# Patient Record
Sex: FEMALE | Race: WHITE | NOT HISPANIC OR LATINO | Employment: FULL TIME | URBAN - METROPOLITAN AREA
[De-identification: names, ages, dates, MRNs, and addresses within clinical notes are randomized per-mention and may not be internally consistent; named-entity substitution may affect disease eponyms.]

---

## 2021-01-29 ENCOUNTER — OFFICE VISIT - HEALTHEAST (OUTPATIENT)
Dept: FAMILY MEDICINE | Facility: CLINIC | Age: 21
End: 2021-01-29

## 2021-01-29 DIAGNOSIS — Z13.228 SCREENING FOR METABOLIC DISORDER: ICD-10-CM

## 2021-01-29 DIAGNOSIS — Z00.00 ROUTINE GENERAL MEDICAL EXAMINATION AT A HEALTH CARE FACILITY: ICD-10-CM

## 2021-01-29 DIAGNOSIS — R35.0 URINE FREQUENCY: ICD-10-CM

## 2021-01-29 DIAGNOSIS — Z11.3 SCREEN FOR STD (SEXUALLY TRANSMITTED DISEASE): ICD-10-CM

## 2021-01-29 DIAGNOSIS — N39.0 RECURRENT UTI: ICD-10-CM

## 2021-01-29 LAB
ALBUMIN UR-MCNC: NEGATIVE MG/DL
APPEARANCE UR: CLEAR
BACTERIA #/AREA URNS HPF: ABNORMAL HPF
BILIRUB UR QL STRIP: NEGATIVE
COLOR UR AUTO: YELLOW
GLUCOSE UR STRIP-MCNC: NEGATIVE MG/DL
HGB UR QL STRIP: ABNORMAL
KETONES UR STRIP-MCNC: NEGATIVE MG/DL
LEUKOCYTE ESTERASE UR QL STRIP: ABNORMAL
NITRATE UR QL: NEGATIVE
PH UR STRIP: 7 [PH] (ref 5–8)
RBC #/AREA URNS AUTO: ABNORMAL HPF
SP GR UR STRIP: 1.02 (ref 1–1.03)
SQUAMOUS #/AREA URNS AUTO: ABNORMAL LPF
UROBILINOGEN UR STRIP-ACNC: ABNORMAL
WBC #/AREA URNS AUTO: ABNORMAL HPF

## 2021-01-29 ASSESSMENT — PATIENT HEALTH QUESTIONNAIRE - PHQ9: SUM OF ALL RESPONSES TO PHQ QUESTIONS 1-9: 0

## 2021-01-29 ASSESSMENT — ANXIETY QUESTIONNAIRES
GAD7 TOTAL SCORE: 0
3. WORRYING TOO MUCH ABOUT DIFFERENT THINGS: NOT AT ALL
4. TROUBLE RELAXING: NOT AT ALL
1. FEELING NERVOUS, ANXIOUS, OR ON EDGE: NOT AT ALL
2. NOT BEING ABLE TO STOP OR CONTROL WORRYING: NOT AT ALL
5. BEING SO RESTLESS THAT IT IS HARD TO SIT STILL: NOT AT ALL
IF YOU CHECKED OFF ANY PROBLEMS ON THIS QUESTIONNAIRE, HOW DIFFICULT HAVE THESE PROBLEMS MADE IT FOR YOU TO DO YOUR WORK, TAKE CARE OF THINGS AT HOME, OR GET ALONG WITH OTHER PEOPLE: NOT DIFFICULT AT ALL
7. FEELING AFRAID AS IF SOMETHING AWFUL MIGHT HAPPEN: NOT AT ALL
6. BECOMING EASILY ANNOYED OR IRRITABLE: NOT AT ALL

## 2021-01-29 ASSESSMENT — MIFFLIN-ST. JEOR: SCORE: 1384.13

## 2021-01-30 LAB — BACTERIA SPEC CULT: NO GROWTH

## 2021-01-31 ENCOUNTER — COMMUNICATION - HEALTHEAST (OUTPATIENT)
Dept: FAMILY MEDICINE | Facility: CLINIC | Age: 21
End: 2021-01-31

## 2021-02-02 LAB
C TRACH DNA SPEC QL PROBE+SIG AMP: NEGATIVE
N GONORRHOEA DNA SPEC QL NAA+PROBE: NEGATIVE

## 2021-02-03 ENCOUNTER — COMMUNICATION - HEALTHEAST (OUTPATIENT)
Dept: PEDIATRICS | Facility: CLINIC | Age: 21
End: 2021-02-03

## 2021-02-03 DIAGNOSIS — Z30.09 ENCOUNTER FOR COUNSELING REGARDING CONTRACEPTION: ICD-10-CM

## 2021-02-03 RX ORDER — LEVONORGESTREL/ETHIN.ESTRADIOL 0.1-0.02MG
1 TABLET ORAL DAILY
Qty: 3 PACKAGE | Refills: 4 | Status: SHIPPED | OUTPATIENT
Start: 2021-02-03 | End: 2022-08-04

## 2021-02-05 ENCOUNTER — COMMUNICATION - HEALTHEAST (OUTPATIENT)
Dept: FAMILY MEDICINE | Facility: CLINIC | Age: 21
End: 2021-02-05

## 2021-03-12 ENCOUNTER — COMMUNICATION - HEALTHEAST (OUTPATIENT)
Dept: FAMILY MEDICINE | Facility: CLINIC | Age: 21
End: 2021-03-12

## 2021-03-15 ENCOUNTER — COMMUNICATION - HEALTHEAST (OUTPATIENT)
Dept: FAMILY MEDICINE | Facility: CLINIC | Age: 21
End: 2021-03-15

## 2021-03-19 ENCOUNTER — COMMUNICATION - HEALTHEAST (OUTPATIENT)
Dept: FAMILY MEDICINE | Facility: CLINIC | Age: 21
End: 2021-03-19

## 2021-04-06 ENCOUNTER — COMMUNICATION - HEALTHEAST (OUTPATIENT)
Dept: FAMILY MEDICINE | Facility: CLINIC | Age: 21
End: 2021-04-06

## 2021-04-07 ENCOUNTER — COMMUNICATION - HEALTHEAST (OUTPATIENT)
Dept: SCHEDULING | Facility: CLINIC | Age: 21
End: 2021-04-07

## 2021-05-27 ASSESSMENT — PATIENT HEALTH QUESTIONNAIRE - PHQ9: SUM OF ALL RESPONSES TO PHQ QUESTIONS 1-9: 0

## 2021-05-28 ASSESSMENT — ANXIETY QUESTIONNAIRES: GAD7 TOTAL SCORE: 0

## 2021-06-05 VITALS
SYSTOLIC BLOOD PRESSURE: 110 MMHG | HEIGHT: 66 IN | HEART RATE: 72 BPM | DIASTOLIC BLOOD PRESSURE: 62 MMHG | BODY MASS INDEX: 21.34 KG/M2 | WEIGHT: 132.8 LBS

## 2021-06-07 ENCOUNTER — COMMUNICATION - HEALTHEAST (OUTPATIENT)
Dept: FAMILY MEDICINE | Facility: CLINIC | Age: 21
End: 2021-06-07

## 2021-06-07 DIAGNOSIS — N39.0 RECURRENT UTI: ICD-10-CM

## 2021-06-14 NOTE — TELEPHONE ENCOUNTER
You saw patient on 1/29/21 she would like you to prescribe her contraception. It was prescribed from virtual. Patient wants to transfer care to Dr. Byrne.

## 2021-06-16 PROBLEM — N39.0 RECURRENT UTI: Status: ACTIVE | Noted: 2021-01-29

## 2021-06-16 NOTE — TELEPHONE ENCOUNTER
Having urgency that started yesterday    No burning with urination    No blood in the urine    Has to go to the bathroom every hour    No changes in vaginal discharge    No fever that she is aware    Is hydrating well    No abdominal pain, flank pain,     No sores or rashes    No problems with urination    No nausea, vomiting    Took her bactrim after intercourse on monday    Last UTI was either January or Feb    No STD risk    Denies pregnancy    Patient is currently in [place] and will monitor symptoms for now.    Advised that if symptoms to be seen.    COVID 19 Nurse Triage Plan/Patient Instructions    Please be aware that novel coronavirus (COVID-19) may be circulating in the community. If you develop symptoms such as fever, cough, or SOB or if you have concerns about the presence of another infection including coronavirus (COVID-19), please contact your health care provider or visit  [email or web address].    Disposition/Instructions    Home care recommended. Follow home care protocol based instructions.    Thank you for taking steps to prevent the spread of this virus.  o Limit your contact with others.  o Wear a simple mask to cover your cough.  o Wash your hands well and often.    Resources    [facility]: About COVID-19: [email or web address]    CDC: What to Do If You're Sick: www.cdc.gov/coronavirus/2019-ncov/about/steps-when-sick.html    CDC: Ending Home Isolation: www.cdc.gov/coronavirus/2019-ncov/hcp/disposition-in-home-patients.html     CDC: Caring for Someone: www.cdc.gov/coronavirus/2019-ncov/if-you-are-sick/care-for-someone.html     [facility]: Interim Guidance for Hospital Discharge to Home: www.health.[facility].[place]/diseases/coronavirus/hcp/hospdischarge.pdf    [facility] clinical trials (COVID-19 research studies): clinicalaffairs.[facility]/umn-clinical-trials     Below are the COVID-19 hotlines at the [place] Department of Health ([facility]). Interpreters are available.    o For health questions: Call 128-742-9776 or 1-445.720.6498 (7 a.m. to 7 p.m.)  o For questions about schools and childcare: Call 068-291-4098 or 1-530.507.5070 (7 a.m. to 7 p.m.)       Rosa Isela King, RN   [facility] Medication Refill and Triage Nurse  12:01 PM  4/7/2021    Reason for Disposition    [1] Painful urination AND [2] EITHER frequency or urgency AND [3] has on-call doctor    Additional Information    Negative: Shock suspected (e.g., cold/pale/clammy skin, too weak to stand, low BP, rapid pulse)    Negative: Sounds like a life-threatening emergency to the triager    Negative: Unable to urinate (or only a few drops) and bladder feels very full    Negative: Vomiting    Negative: Patient sounds very sick or weak to the triager    Negative: Severe pain with urination    Negative: Fever > 100.5 F (38.1 C)    Negative: Side (flank) or lower back pain present    Negative: Taking antibiotic > 24 hours for UTI and fever persists    Negative: Taking antibiotic > 3 days for UTI and painful urination not improved    Negative: Unusual vaginal discharge    Negative: > 2 UTIs in last year    Negative: Patient is worried about sexually transmitted disease (STD)    Negative: Age > 50 years    Negative: Possibility of pregnancy    Negative: Painful urination AND EITHER frequency or urgency    Negative: Shock suspected (e.g., cold/pale/clammy skin, too weak to stand, low BP, rapid pulse)    Negative: Sounds like a life-threatening emergency to the triager    Negative: [1] Unable to urinate (or only a few drops) > 4 hours AND [2] bladder feels very full (e.g., palpable bladder or strong urge to urinate)    Negative: Vomiting    Negative: Patient sounds very sick or weak to the triager    Negative: [1] SEVERE pain with urination  (e.g., excruciating) AND [2] not improved after 2 hours of pain medicine and Sitz bath    Negative: Fever > 100.5 F (38.1 C)    Negative: Side (flank) or lower back pain present     Negative: Diabetes mellitus or weak immune system (e.g., HIV positive, cancer chemo, splenectomy, organ transplant, chronic steroids)    Negative: Bedridden (e.g., nursing home patient, CVA, chronic illness, recovering from surgery)    Negative: Artificial heart valve or artificial joint    Negative: Unusual vaginal discharge (e.g., bad smelling, yellow, green, or foamy-white)    Negative: Patient is worried about sexually transmitted disease (STD)    Negative: Possibility of pregnancy    Negative: Blood in urine (red, pink, or tea-colored)    Negative: Age > 50 years    Negative: > 2 UTI's in last year    Negative: All other patients with painful urination(Exception: [1] EITHER frequency or urgency AND [2] has on-call doctor)    Protocols used: URINATION PAIN - FEMALE-A-AH, URINATION PAIN - FEMALE-A-OH

## 2021-06-16 NOTE — TELEPHONE ENCOUNTER
Please advise if patient needs to come in for pap only office visit or ok to wait until next physical. She was just seen on 1/29/21 for physical but was not 21 yet and pap was not preformed.    Janet HERNANDEZ CMA ([facility])

## 2021-06-18 NOTE — PATIENT INSTRUCTIONS - HE
"Patient Instructions by Aditi Byrne MD at 1/29/2021  9:40 AM     Author: Aditi Byrne MD Service: -- Author Type: Physician    Filed: 1/29/2021 10:07 AM Encounter Date: 1/29/2021 Status: Signed    : Aditi Byrne MD (Physician)         Patient Education     Bladder Infection, Female (Adult)    Urine is normally doesn't have any bacteria in it. But bacteria can get into the urinary tract from the skin around the rectum. Or they can travel in the blood from elsewhere in the body. Once they are in your urinary tract, they can cause infection in the urethra (urethritis), the bladder (cystitis), or the kidneys (pyelonephritis).  The most common place for an infection is in the bladder. This is called a bladder infection. This is one of the most common infections in women. Most bladder infections are easily treated. They are not serious unless the infection spreads to the kidney.  The phrases \"bladder infection,\" \"UTI,\" and \"cystitis\" are often used to describe the same thing. But they are not always the same. Cystitis is an inflammation of the bladder. The most common cause of cystitis is an infection.  Symptoms  The infection causes inflammation in the urethra and bladder. This causes many of the symptoms. The most common symptoms of a bladder infection are:    Pain or burning when urinating    Having to urinate more often than usual    Urgent need to urinate    Only a small amount of urine comes out    Blood in urine    Abdominal discomfort. This is usually in the lower abdomen above the pubic bone.    Cloudy urine    Strong- or bad-smelling urine    Unable to urinate (urinary retention)    Unable to hold urine in (urinary incontinence)    Fever    Loss of appetite    Confusion (in older adults)  Causes  Bladder infections are not contagious. You can't get one from someone else, from a toilet seat, or from sharing a bath.  The most common cause of bladder infections is bacteria from the bowels. The bacteria " get onto the skin around the opening of the urethra. From there, they can get into the urine and travel up to the bladder, causing inflammation and infection. This usually happens because of:    Wiping improperly after urinating. Always wipe from front to back.    Bowel incontinence    Pregnancy    Procedures such as having a catheter inserted    Older age    Not emptying your bladder. This can allow bacteria a chance to grow in your urine.    Dehydration    Constipation    Sex    Use of a diaphragm for birth control   Treatment  Bladder infections are diagnosed by a urine test. They are treated with antibiotics and usually clear up quickly without complications. Treatment helps prevent a more serious kidney infection.  Medicines  Medicines can help in the treatment of a bladder infection:    Take antibiotics until they are used up, even if you feel better. It is important to finish them to make sure the infection has cleared.    You can use acetaminophen or ibuprofen for pain, fever, or discomfort, unless another medicine was prescribed. If you have chronic liver or kidney disease, talk with your healthcare provider before using these medicines. Also talk with your provider if you've ever had a stomach ulcer or gastrointestinal bleeding, or are taking blood-thinner medicines.    If you are given phenazopydridine to reduce burning with urination, it will cause your urine to become a bright orange color. This can stain clothing.  Care and prevention  These self-care steps can help prevent future infections:    Drink plenty of fluids to prevent dehydration and flush out your bladder. Do this unless you must restrict fluids for other health reasons, or your doctor told you not to.    Proper cleaning after going to the bathroom is important. Wipe from front to back after using the toilet to prevent the spread of bacteria.    Urinate more often. Don't try to hold urine in for a long time.    Wear loose-fitting clothes  and cotton underwear. Avoid tight-fitting pants.    Improve your diet and prevent constipation. Eat more fresh fruit and vegetables, and fiber, and less junk and fatty foods.    Avoid sex until your symptoms are gone.    Avoid caffeine, alcohol, and spicy foods. These can irritate your bladder.    Urinate right after intercourse to flush out your bladder.    If you use birth control pills and have frequent bladder infections, discuss it with your doctor.  Follow-up care  Call your healthcare provider if all symptoms are not gone after 3 days of treatment. This is especially important if you have repeat infections.  If a culture was done, you will be told if your treatment needs to be changed. If directed, you can call to find out the results.  If X-rays were done, you will be told if the results will affect your treatment.  Call 911  Call 911 if any of the following occur:    Trouble breathing    Hard to wake up or confusion    Fainting or loss of consciousness    Rapid heart rate  When to seek medical advice  Call your healthcare provider right away if any of these occur:    Fever of 100.4 F (38.0 C) or higher, or as directed by your healthcare provider    Symptoms are not better by the third day of treatment    Back or belly (abdominal) pain that gets worse    Repeated vomiting, or unable to keep medicine down    Weakness or dizziness    Vaginal discharge    Pain, redness, or swelling in the outer vaginal area (labia)  Date Last Reviewed: 10/1/2016    5250-2919 [employer]. [address] [place] [address]. All rights reserved. This information is not intended as a substitute for professional medical care. Always follow your healthcare professional's instructions.

## 2021-06-25 NOTE — TELEPHONE ENCOUNTER
RN cannot approve Refill Request    RN can NOT refill this medication med is not covered by policy/route to provider. Last office visit: 2021 Aditi Byrne MD Last Physical: Visit date not found Last MTM visit: Visit date not found Last visit same specialty: 2021 Aditi Byrne MD.  Next visit within 3 mo: Visit date not found  Next physical within 3 mo: Visit date not found      Camryn Guzmán, [facility] Triage/Med Refill 2021    Requested Prescriptions   Pending Prescriptions Disp Refills     sulfamethoxazole-trimethoprim (BACTRIM DS) 800-160 mg per tablet 30 tablet 0     Si tab after sex to prevent recurrent UTI       There is no refill protocol information for this order

## 2021-06-30 NOTE — PROGRESS NOTES
Progress Notes by Aditi Byrne MD at 1/29/2021  9:40 AM     Author: Aditi Byrne MD Service: -- Author Type: Physician    Filed: 1/29/2021 10:13 AM Encounter Date: 1/29/2021 Status: Signed    : Aditi Byrne MD (Physician)       FEMALE PREVENTATIVE EXAM    Assessment and Plan:     Patient has been advised of split billing requirements and indicates understanding: Yes    Olivia was seen today for urine problem.    Routine general medical examination at a health care facility  I discussed the following with the patient:   Adult Healthy Living: Importance of regular exercise  Healthy nutrition  Getting adequate sleep  Stress management  Supplement use  Herbal medications/alternative medical therapies      Screening for metabolic disorder  -     Glycosylated Hemoglobin A1c  -     Lipid Cascade    Urine frequency  -Trace leukocyte esterase will wait for urine culture before doing any treatment      Urinalysis-UC if Indicated    Screen for STD (sexually transmitted disease)  Last STD check was 2018 never positive  -     Chlamydia trachomatis & Neisseria gonorrhoeae, Amplified Detection    Recurrent UTI  Comments:  3 bladder infections in the last several months. Last 1 was  at  urgent care with justin.prior Tx with  Macrobid. Symptoms seem to occur after intercourse.   Preventive antibiotic started 1 tablet after each sexual activity  Orders:  -     sulfamethoxazole-trimethoprim (BACTRIM DS) 800-160 mg per tablet; 1 tab after sex to prevent recurrent UTI        Next follow up:  1 yr for annual physical    Immunization Reviewed and if needed ordered please see A/P    There are no preventive care reminders to display for this patient.    Immunization History   Administered Date(s) Administered   ? Dtap 2000, 2000, 2000, 06/11/2001, 05/12/2005   ? HPV 9 Valent 06/17/2016   ? HPV Quadrivalent 03/06/2014, 09/09/2014   ? Hep B / HiB 2000, 2000, 03/21/2001   ? Hepatitis A, Ped/Adol 2 Dose  IM (18yr & under) 03/06/2014, 06/17/2016   ? INFLUENZA,SEASONAL QUAD, PF, =/> 6months 02/19/2018, 11/06/2019   ? IPV 2000, 2000, 2000, 05/12/2005   ? Influenza W9g0-05, 11/21/2009   ? Influenza, Live, Nasal LAIV3 11/26/2010   ? Influenza,live, Nasal Laiv4 11/26/2010   ? Influenza,seasonal, Inj IIV3 12/29/2006   ? MMR 06/11/2001, 05/12/2005   ? Meningococcal MCV4O 06/17/2016   ? Meningococcal MCV4P 06/20/2012, 06/17/2016   ? POLIO, Unspecified 2000, 2000, 2000, 05/12/2005   ? Pneumo Conj 7-V(before 2010) 2000, 2000, 03/21/2001, 06/11/2001   ? Tdap 06/20/2012   ? Varicella 03/21/2001, 02/02/2011         The following high BMI interventions were performed this visit: dietary management education, guidance, and counseling    I have had an Advance Directives discussion with the patient.    Subjective:   Chief Complaint: Olivia Stanley is an 20 y.o. female here for a preventative health visit.     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  Couple virtual visit in 1 urgent care visit which was the last 1 treated with different antibiotics during that time  Patient trying her best to prevent by urinating and cleaning herself after sex  No culture report I can see from previous labs  Patient's last menstrual period was 01/07/2021 (exact date).     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 No  Do you typically exercising at least 40 min, 3-4 times per week?  Yes  Do you usually eat at least 4 servings of fruit and vegetables a day, include whole grains and fiber and avoid regularly eating high fat foods? Yes  Have you had an eye exam in the past two years? Yes  Do you see a dentist twice per year? NO  Do you have any concerns regarding sleep? No    Safety Screen  If you own firearms, are they secured in a locked gun cabinet or with trigger locks? The patient does not own any firearms    Do you feel you are safe where you are living?: Yes (1/29/2021  9:52 AM)  Do you feel you are safe in your relationship(s)?: Yes (1/29/2021  9:52 AM)      Review of Systems:  Please see above.  The rest of the review of systems are negative for all systems.     Pap History:   Yes - updated in Problem List and Health Maintenance accordingly    Cancer Screening     Patient has no health maintenance due at this time          Patient Care Team:  Provider, No Primary Care as PCP - General      History     Reviewed By Date/Time Sections Reviewed    Dennise Hull CMA 1/29/2021  9:52 AM Tobacco, Alcohol, Drug Use             Objective:   Vital Signs:   Visit Vitals  /62 (Patient Site: Left Arm, Patient Position: Sitting, Cuff Size: Adult Regular)   Pulse 72   Ht 5' 6\" (1.676 m)   Wt 132 lb 12.8 oz (60.2 kg)   LMP 01/07/2021 (Exact Date)   Breastfeeding No   BMI 21.43 kg/m         PHYSICAL EXAM  Physical Exam:  General Appearance:  Appears comfortable, Alert, cooperative, no distress,   Head: Normocephalic, without obvious abnormality, atraumatic  Eyes: PERRL, conjunctiva/corneas clear, EOM's intact, both eyes             Nose: Nares normal, no drainage   Throat: Lips, mucosa, and tongue normal; teeth and gums normal  Neck: Supple, symmetrical, trachea midline, no adenopathy;                      Lungs: Clear to auscultation bilaterally, respirations unlabored  Heart: Regular rate and rhythm, " S1 and S2 normal, no murmur, rubs or gallop  Abdomen: Soft, non-tender, bowel sounds active all four quadrants,   no masses, no organomegaly  Extremities: Extremities normal, atraumatic, no cyanosis or edema  Pulses: DP pulses are 1-2+ bilat.    Skin: no rashes or lesions  Neurologic: normal and equal strength bilat in upper and lower extremities                 Medication List          Accurate as of January 29, 2021 10:11 AM. If you have any questions, ask your nurse or doctor.            START taking these medications    sulfamethoxazole-trimethoprim 800-160 mg per tablet  Also known as: Bactrim DS  INSTRUCTIONS: 1 tab after sex to prevent recurrent UTI  Started by: Aditi Byrne MD           CONTINUE taking these medications    levonorgestrel-ethinyl estradiol 0.1-20 mg-mcg per tablet  Also known as: RAMY YEH LESSINA  INSTRUCTIONS: Take 1 tablet by mouth daily.              Where to Get Your Medications      These medications were sent to [facility] [id number] [facility] - [place], [place] - [address], [place] [address]    Phone: [phone]     sulfamethoxazole-trimethoprim 800-160 mg per tablet         Additional Screenings Completed Today:

## 2021-07-03 NOTE — ADDENDUM NOTE
Addendum Note by Benjamin Wilcox CMA at 1/29/2021  9:40 AM     Author: Benjamin Wilcox CMA Service: -- Author Type: Certified Medical Assistant    Filed: 1/29/2021 11:34 AM Encounter Date: 1/29/2021 Status: Signed    : Benjamin Wilcox CMA (Certified Medical Assistant)    Addended by: BENJAMIN WILCOX on: 1/29/2021 11:34 AM        Modules accepted: Orders

## 2021-10-21 ENCOUNTER — NURSE TRIAGE (OUTPATIENT)
Dept: NURSING | Facility: CLINIC | Age: 21
End: 2021-10-21

## 2021-10-21 DIAGNOSIS — Z30.09 COUNSELING FOR BIRTH CONTROL REGARDING INTRAUTERINE DEVICE (IUD): Primary | ICD-10-CM

## 2021-10-21 RX ORDER — MISOPROSTOL 200 UG/1
TABLET ORAL
Qty: 1 TABLET | Refills: 0 | Status: SHIPPED | OUTPATIENT
Start: 2021-10-21 | End: 2022-08-04

## 2021-10-21 NOTE — TELEPHONE ENCOUNTER
Clinic Action Needed: Yes, pt has question re: procedure. Please call her at [phone] or may respond via [employer].    [facility] Triage Call  Presenting Problem:    Allison calls if clinics can have her sedated for an IUD insertion, reason: would not like to experience the pain. No anxiety concerns.    Pt has an appointment with PCP on 11/4.    Sharda Cid RN/[place] Nurse Advisor      Additional Information    Nursing judgment    Protocols used: NO PROTOCOL AVAILABLE - INFORMATION ONLY-A-OH

## 2021-10-21 NOTE — TELEPHONE ENCOUNTER
Unfortunately we do not do any conscious sedation prior to the procedure in clinic setting    We can refer her to OB if she like    For now :  I did send the prescription for Cytotec which she can insert inside the vagina night before the procedure which keep the cervix open and easy to put him    Also to recommend taking 800 mg of ibuprofen 1 to 2-hour before the procedure procedure    I can also send the prescription for lorazepam which will help with anxiety and may be some pain.  Which she can take on the day of the procedure at the clinic  I will not recommend driving while taking this medication      Aditi Byrne MD 10/21/2021 2:44 PM   [facility].  [phone]

## 2021-11-02 NOTE — TELEPHONE ENCOUNTER
Patient never received Dr. Byrne's message from 10/21 so she was calling back to see if this was addressed. Relayed Dr. Byrne's message and she would like a referral placed for OB/GYN for IUD insertion.  Please place referral for patient.  Shelbie Dhillon LPN

## 2021-11-04 ENCOUNTER — OFFICE VISIT (OUTPATIENT)
Dept: FAMILY MEDICINE | Facility: CLINIC | Age: 21
End: 2021-11-04
Payer: COMMERCIAL

## 2021-11-04 ENCOUNTER — TELEPHONE (OUTPATIENT)
Dept: OBGYN | Facility: CLINIC | Age: 21
End: 2021-11-04

## 2021-11-04 VITALS
WEIGHT: 135.7 LBS | DIASTOLIC BLOOD PRESSURE: 66 MMHG | SYSTOLIC BLOOD PRESSURE: 104 MMHG | BODY MASS INDEX: 21.9 KG/M2 | HEART RATE: 68 BPM

## 2021-11-04 DIAGNOSIS — N39.0 RECURRENT UTI: ICD-10-CM

## 2021-11-04 DIAGNOSIS — Z30.09 COUNSELING FOR BIRTH CONTROL, INTRAUTERINE DEVICE: Primary | ICD-10-CM

## 2021-11-04 DIAGNOSIS — L72.3 SEBACEOUS CYST: ICD-10-CM

## 2021-11-04 DIAGNOSIS — Z12.4 SCREENING FOR CERVICAL CANCER: ICD-10-CM

## 2021-11-04 PROCEDURE — 99214 OFFICE O/P EST MOD 30 MIN: CPT | Performed by: FAMILY MEDICINE

## 2021-11-04 RX ORDER — NITROFURANTOIN 25; 75 MG/1; MG/1
CAPSULE ORAL
Qty: 30 CAPSULE | Refills: 1 | Status: SHIPPED | OUTPATIENT
Start: 2021-11-04 | End: 2023-01-05

## 2021-11-04 NOTE — TELEPHONE ENCOUNTER
Referral for iud insertion states pt wants sedation for this. Pls call pt to discuss what if any sedation can be used,  needed? Etc. Unsure if pt had iud consult yet?

## 2021-11-04 NOTE — PROGRESS NOTES
Assessment/plan   Olivia Stanley is a 21 year old female who is  establish patient to my practice here with chief complaint     Patient presents with:  Discuss Pap and IUD  Reaction: Drug reaction to Bactrim  Tetanus Vaccine: Update  Bumps: Bumps on scalp        Olivia was seen today for discuss pap and iud, reaction, tetanus vaccine and bumps.    Diagnoses and all orders for this visit:    Counseling for birth control, intrauterine device  Patient will reach out to gynecologist for further management of this request of IUD placement  Screening for cervical cancer  -     Pap Screen only - recommended age 21 - 24 years; Future    Recurrent UTI  -Patient feel Bactrim causing vaginal itching as soon as she use it postcoital.  Changed to Macrobid for now and see if it similar side effect most likely is either dryness or yeast infection causing the symptoms of itching       nitroFURantoin macrocrystal-monohydrate (MACROBID) 100 MG capsule; One tab before sex for preventing recurrent UT    Sebaceous cyst  Comments:  scalp very just small to sebaceous cyst at the crown.  Noninfected nontender reassured the patient that usually they do not cause much problem but if they get bigger and painful she can reach out to our clinic and we be able to manage to remove them    Other orders  -     REVIEW OF HEALTH MAINTENANCE PROTOCOL ORDERS        Subjective:      HPI: Olivia Stanley is a 21 year old female is here for.    Patient's last menstrual period was 07/24/2021 (exact date).  Taking pills every day but no break through bleeding for last 3 month. Most the time cycle come with 2-3 days of sugar pills   Currently active with condoms   Recurrent UTI's currently on preventive bactrim, but feel she might be allergic, stats whenever take the bactrim have immediate itching in the vagina and feel sore for approx one hr?? Not sure if we should call it a true allergic reaction.  She might be allergic to material of the condom per  patient she using latex free.    Patient also looking to get the IUD insertion done but quite scared about the pain she want, conscious sedation which our clinic do not have capacity to do it.  But if willing we can do Cytotec overnight with some Xanax during the visit , we already placed a referral to our gynecologist colleagues to see if able to handle this request and at the same time Pap smear can be done          I have personally reviewed the patient's allergies, medications, past medical history, family history, social history, rooming notes and problem list in detail and updated the patient record as necessary.      No past medical history on file.  No past surgical history on file.  Patient has no active allergies.  Current Outpatient Medications   Medication Sig Dispense Refill     levonorgestrel-ethinyl estradiol (AVIANE,ALESSE,LESSINA) 0.1-20 mg-mcg per tablet [LEVONORGESTREL-ETHINYL ESTRADIOL (AVIANE,ALESSE,LESSINA) 0.1-20 MG-MCG PER TABLET] Take 1 tablet by mouth daily. 3 Package 4     nitroFURantoin macrocrystal-monohydrate (MACROBID) 100 MG capsule One tab before sex for preventing recurrent UT 30 capsule 1     misoprostol (CYTOTEC) 200 MCG tablet Insert close to vagina night before the procedure (Patient not taking: Reported on 11/4/2021) 1 tablet 0     No family history on file.    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  Sexually active new boyfriend and last for 5 months he works for [employer]  Grew up in [place]  Parents  when she was young mom work as a nurse for dialysis cl in  Dad and brother currently living in same household with grandfather    Aditi Byrne MD 1/29/2021 10:10 AM         Objective:     Vitals:    11/04/21 1243    BP: 104/66   Pulse: 68   Weight: 61.6 kg (135 lb 11.2 oz)       Physical Exam:   Physical Exam:  General Appearance:  Appears comfortable, Alert, cooperative, no distress,   Head: Normocephalic, without obvious abnormality, atraumatic  Eyes: PERRL, conjunctiva/corneas clear, EOM's intact, both eyes             Nose: Nares normal, no drainage   Throat: Lips, mucosa, and tongue normal; teeth and gums normal  Neck: Supple, symmetrical, trachea midline, no adenopathy;                      Lungs: Clear to auscultation bilaterally, respirations unlabored  Heart: Regular rate and rhythm, S1 and S2 normal, no murmur, rubs or gallop  Extremities: Extremities normal, atraumatic, no cyanosis or edema  Pulses: DP pulses are 1-2+ bilat.    Skin: Pea-sized sebaceous cyst midline on the scalp nontender noninfected  Neurologic: normal and equal strength bilat in upper and lower extremities     25minutes spent on the day of encounter doing chart review, history and exam, documentation, and further activities as noted.     This note has been dictated using voice recognition software. Any grammatical or context distortions are unintentional and inherent to the software    Aditi Byrne MD

## 2021-11-06 ENCOUNTER — MYC MEDICAL ADVICE (OUTPATIENT)
Dept: FAMILY MEDICINE | Facility: CLINIC | Age: 21
End: 2021-11-06
Payer: COMMERCIAL

## 2022-03-27 ENCOUNTER — HEALTH MAINTENANCE LETTER (OUTPATIENT)
Age: 22
End: 2022-03-27

## 2022-07-12 ASSESSMENT — ANXIETY QUESTIONNAIRES
8. IF YOU CHECKED OFF ANY PROBLEMS, HOW DIFFICULT HAVE THESE MADE IT FOR YOU TO DO YOUR WORK, TAKE CARE OF THINGS AT HOME, OR GET ALONG WITH OTHER PEOPLE?: SOMEWHAT DIFFICULT
GAD7 TOTAL SCORE: 8
5. BEING SO RESTLESS THAT IT IS HARD TO SIT STILL: NOT AT ALL
3. WORRYING TOO MUCH ABOUT DIFFERENT THINGS: MORE THAN HALF THE DAYS
6. BECOMING EASILY ANNOYED OR IRRITABLE: SEVERAL DAYS
7. FEELING AFRAID AS IF SOMETHING AWFUL MIGHT HAPPEN: NOT AT ALL
GAD7 TOTAL SCORE: 8
1. FEELING NERVOUS, ANXIOUS, OR ON EDGE: MORE THAN HALF THE DAYS
4. TROUBLE RELAXING: MORE THAN HALF THE DAYS
7. FEELING AFRAID AS IF SOMETHING AWFUL MIGHT HAPPEN: NOT AT ALL
2. NOT BEING ABLE TO STOP OR CONTROL WORRYING: SEVERAL DAYS
GAD7 TOTAL SCORE: 8

## 2022-07-13 ENCOUNTER — TELEPHONE (OUTPATIENT)
Dept: BEHAVIORAL HEALTH | Facility: CLINIC | Age: 22
End: 2022-07-13

## 2022-07-13 ENCOUNTER — HOSPITAL ENCOUNTER (OUTPATIENT)
Dept: BEHAVIORAL HEALTH | Facility: CLINIC | Age: 22
Discharge: HOME | End: 2022-07-13
Attending: FAMILY MEDICINE
Payer: COMMERCIAL

## 2022-07-13 PROCEDURE — 999N000216 HC STATISTIC ADULT CD FACE TO FACE-NO CHRG: Mod: GT,95 | Performed by: COUNSELOR

## 2022-07-13 ASSESSMENT — COLUMBIA-SUICIDE SEVERITY RATING SCALE - C-SSRS
TOTAL  NUMBER OF ABORTED OR SELF INTERRUPTED ATTEMPTS LIFETIME: NO
ATTEMPT LIFETIME: NO
1. IN THE PAST MONTH, HAVE YOU WISHED YOU WERE DEAD OR WISHED YOU COULD GO TO SLEEP AND NOT WAKE UP?: NO
1. HAVE YOU WISHED YOU WERE DEAD OR WISHED YOU COULD GO TO SLEEP AND NOT WAKE UP?: YES
TOTAL  NUMBER OF INTERRUPTED ATTEMPTS LIFETIME: NO
6. HAVE YOU EVER DONE ANYTHING, STARTED TO DO ANYTHING, OR PREPARED TO DO ANYTHING TO END YOUR LIFE?: NO
2. HAVE YOU ACTUALLY HAD ANY THOUGHTS OF KILLING YOURSELF?: NO

## 2022-07-13 ASSESSMENT — PATIENT HEALTH QUESTIONNAIRE - PHQ9
10. IF YOU CHECKED OFF ANY PROBLEMS, HOW DIFFICULT HAVE THESE PROBLEMS MADE IT FOR YOU TO DO YOUR WORK, TAKE CARE OF THINGS AT HOME, OR GET ALONG WITH OTHER PEOPLE: SOMEWHAT DIFFICULT
SUM OF ALL RESPONSES TO PHQ QUESTIONS 1-9: 5
SUM OF ALL RESPONSES TO PHQ QUESTIONS 1-9: 5

## 2022-07-13 NOTE — TELEPHONE ENCOUNTER
Writer has fwd medication management referral only to TC RN pool as next level of care set and replied to referral source. Will wait to hear if referral is appropriate or inappropriate    Layne Lujanra  07/13/22  239    ----- [facility] from [facility]que[facility] sent at 7/13/2022  2:33 PM CDT -----  Regarding: Referral  Transition Clinic Referral   [place] Only   Limited [place] Availability    Type of Referral:    ____Therapy Only    ___X_ Medication Only: Referral will automatically be declined if no next level of care scheduled. Suboxone and Opioid management referrals are automatically denied.  ____Therapy & Medication:  Referral will automatically be declined if no next level of care scheduled. Suboxone and Opioid management referrals are automatically denied.  ____Diagnostic Assessment     Suboxone and Opioid Management Referrals are Automatically Denied    TC Psychiatry cannot see patients who do not have active medical insurance    Referring Provider Name: BRIGIDA Aguilar, [facility]    Clinician completing the assessment. Same as above    Referring Provider: OTHER: Assessment Center    If known, referring provider contact name: same as above; Phone Number: [phone]  Service Line/Location: [facility] [place]    Reason for Transition Clinic Referral: bridging care until psychiatric appointment scheduled    Next Level of Care Patient Will Be Transitioned To:Date: Monday, 8/15/2022  Time: 1:30 pm - 2:30 pm  Provider: Katlin Ya MA, PA-C  Location: [facility], [address], [place] [address]  Phone: [phone]  Type: Telepsychiatry    What Would Be Helpful from the Transition Clinic: seeking medication for anxiety.     Needs: NO    Does Patient Have Access to Technology: yes    Patient E-mail Address: [email or web address]    Current Patient Phone Number: [phone]    Clinician Gender Preference (if applicable): YES:  female, if able.     Landy Mcqueen, [facility], [facility]    Thanks!

## 2022-07-13 NOTE — TELEPHONE ENCOUNTER
Patient have a video appointment today at 2pm with [facility]. Writer placed a call to check in patient. Unable to get a hold of patient. Writer left a voicemail with writer's call back number.

## 2022-07-13 NOTE — PROGRESS NOTES
"[facility] Mental Health and Addiction Assessment [facility]    ADULT Mental Health Assessment Appointment Note    PATIENT'S NAME:  Olivia Stanley    MRN: [id number]  YOB: 2000  Address:  [address] [place], [place] [id number]  PREFERRED PHONE: [phone]  May we leave a referral related message: Yes    DATE OF SERVICE: 22  START TIME: 2:00pm  END TIME: 2:20pm  SERVICE MODALITY:  Video Visit:      Provider verified identity through the following two step process.  Patient provided:  Patient     Telemedicine Visit: The patient's condition can be safely assessed and treated via synchronous audio and visual telemedicine encounter.      Reason for Telemedicine Visit: Services only offered telehealth    Originating Site (Patient Location): Patient's home    Distant Site (Provider Location): Provider Remote Setting- Home Office    Consent:  The patient/guardian has verbally consented to: the potential risks and benefits of telemedicine (video visit) versus in person care; bill my insurance or make self-payment for services provided; and responsibility for payment of non-covered services.     Patient would like the video invitation sent by:  My Chart    Mode of Communication:  Video Conference via [employer]    As the provider I attest to compliance with applicable laws and regulations related to telemedicine.    Identifying Information:  Patient is a 22 year old,  individual.  Patient was referred for an assessment by self.  Patient attended the session alone. Patient identified their preferred language to be English. Patient reported they does not need the assistance of an  or other support involved in therapy.     Services Requested:   The reason for seeking services at this time is: \"Anxiety\".  Patient reports she is interested in medications. Patient reports she met with a psychiatrist when she was younger but that was brief. The problem(s) began 2004.   Patient has " attempted to resolve these concerns in the past by therapy. Patient states, I have always been an anxious person since being a kid. I think my parents should have done something but what is done is done. It is more manageable then it was as a kid. Patient reports periods of feeling okay and periods that are worse. Patient states, feel like I am doing a bad job with my partner or at work, or that everyone hates me. Patient states, I just graduated recently and started my new job. Patient reports she does not want things to get worse.      Patient's highest education level was college graduate.  Patient's current relationship status is she has a partner or significant other. Patient identified their sexual orientation as bi-sexual.  Patient reported having  0 child(michael). Patient identified partner; mother; co-worker as part of their support system.  Patient identified the quality of these relationships as good.  Patient's current living/housing situation involves living with some family. Patient is currently employed fulltime. 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 Work with people my age with similar background. I am on call 2 weeks/month. Patient reports their finances are obtained through employment; parents. Patient does not identify finances as a current stressor. Patient does not have legal concerns.    Mental Health:  Review of Symptoms per patient report:  Depression: Change in appetite and Low self-worth  Liliya: No Symptoms  Psychosis: No Symptoms  Anxiety: Excessive worry, Nervousness, Physical complaints, such as headaches, stomachaches, muscle tension, Social anxiety, Ruminations and Irritability, patient states, always had GI issues, do not think my diet has changed too much. Clench my abs a lot and notice that after a while, I am doing it. Since I started the job, reduced appetite and drinking a little bit more. If having a really bad day get  "sinking feeling in stomach and shortness of breath and couple days where I had to leave because it got really bad. 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 Cannot have water or food and get \"hangry\". When having a really intense day it will be later in the day and it slips out.   Panic: Shortness of breath and Sense of impending doom  Post Traumatic Stress Disorder: No Symptoms  Eating Disorder: No Symptoms  ADD / ADHD: No symptoms  Conduct Disorder: No symptoms  Autism Spectrum Disorder: No symptoms  Obsessive Compulsive Disorder: No Symptoms     Substance Use:  Do you  have a history of alcohol or illicit drug use?           Substance History of use Age of first use Date of last use       Pattern and duration of use (include amounts and frequency)   Alcohol currently use    19 7/11/2022 Per patient: when I was in school earlier this year, one beer couple times per week when got home from school because I like it. Recently it has been more frequent and more quantity. More nights of the week and couple beers.    Cannabis    used in the past 19 7/12/2019 REPORTS SUBSTANCE USE: N/A      Amphetamines    never used   REPORTS SUBSTANCE USE: N/A   Cocaine/crack     never used       REPORTS SUBSTANCE USE: N/A   Hallucinogens never used         REPORTS SUBSTANCE USE: N/A   Inhalants never used         REPORTS SUBSTANCE USE: N/A   Heroin never used         REPORTS SUBSTANCE USE: N/A   Other Opiates never used   REPORTS SUBSTANCE USE: N/A   Benzodiazepine    never used   REPORTS SUBSTANCE USE: N/A   Barbiturates never used   REPORTS SUBSTANCE USE: N/A   Over the counter meds never used   REPORTS SUBSTANCE USE: N/A   Caffeine currently use 16  Per patient: call day for work or a lot happening, stop for a coffee, usually half caf latte, caffeine makes it worse, so try to be stringent when I let myself have it. Already anxious or if I am in a " bad mood avoid it all together.   Nicotine  never used   REPORTS SUBSTANCE USE: N/A   Other substances not listed above:  Identify:  never used   REPORTS SUBSTANCE USE: N/A     Patient reported the following problems as a result of their substance use: no problems, not applicable.     Significant Losses / Trauma / Abuse / Neglect Issues:   Patient did not serve in the .  There are indications or report of significant loss, trauma, abuse or neglect issues related to: are no indications and client denies any losses, trauma, abuse, or neglect concerns.  Concerns for possible neglect are not present.     Medical History:  Patient reports current meds as:        Current Outpatient Medications   Medication Sig     nitroFURantoin macrocrystal-monohydrate (MACROBID) 100 MG capsule One tab before sex for preventing recurrent UT      No current facility-administered medications for this encounter.      Medication Adherence:  Patient reports taking.     Patient Allergies:  No Known Allergies     Medical History:    Past Medical History   History reviewed. No pertinent past medical history.   Patient denies any issues with pain.   There are not significant appetite / nutritional concerns / weight changes.   Patient does not report a history of head injury / trauma / cognitive impairment.      Current Mental Status Exam:   Appearance:  Appropriate    Eye Contact:  Good   Psychomotor:  Normal   Attitude / Demeanor: Cooperative  Pleasant  Speech Rate:  Normal/ Responsive  Volume:  Normal  volume  Language:  intact  Mood:   Anxious   Affect:   Appropriate    Thought Content: Clear   Thought Process: Coherent     Associations:  No loosening of associations  Insight:   Good   Judgment:  Intact   Orientation:  All  Attention:  Good    Rating Scales:  PHQ9:    PHQ-9 SCORE 1/29/2021 7/13/2022   PHQ-9 Total Score MyChart - 5 (Mild depression)   PHQ-9 Total Score 0 5   ;    GAD7:    IRWIN-7 SCORE 1/29/2021 7/12/2022   Total Score -  8 (mild anxiety)   Total Score 0 8     Safety Assessment:   Current Safety Concerns:  [place] Suicide Severity Rating Scale (Lifetime/Recent)  [place] Suicide Severity Rating (Lifetime/Recent) 7/13/2022   1. Wish to be Dead (Lifetime) 1   Wish to be Dead Description (Lifetime) Patient states, infrequently in the past has SI, not within the last couple years or so. Between high school and college. Not very often.   1. Wish to be Dead (Past 1 Month) 0   2. Non-Specific Active Suicidal Thoughts (Lifetime) 0   Actual Attempt (Lifetime) 0   Has subject engaged in non-suicidal self-injurious behavior? (Lifetime) 0   Interrupted Attempts (Lifetime) 0   Aborted or Self-Interrupted Attempt (Lifetime) 0   Preparatory Acts or Behavior (Lifetime) 0   Calculated C-SSRS Risk Score (Lifetime/Recent) No Risk Indicated   Patient states, infrequently in the past has SI, not within the last couple years or so. Between high school and college. Not very often.  Patient denies current homicidal ideation and behaviors.  Patient denies current self-injurious ideation and behaviors.    Patient denied risk behaviors associated with substance use.  Patient denies any high risk behaviors associated with mental health symptoms.  Patient reports the following current concerns for their personal safety: None.  Patient reports there are not  firearms in the house.    Clinical Impressions:  A. Excessive anxiety and worry about a number of events or activities (such as work or school performance).   B. The person finds it difficult to control the worry.  C. Select 3 or more symptoms (required for diagnosis). Only one item is required in children.   - Restlessness or feeling keyed up or on edge.    - Irritability.    - Muscle tension.   Unspecified Anxiety Disorder , Symptoms characteristic of an anxiety disorder that caused clinically significant distress or impairment in social, occupational, or other important areas of functioning predominate  but do not meet the full criteria for any of the disorders of the anxiety disorders diagnostic class.    Therapeutic Interventions Provided: supportive active listening, provided Psychoeducational support and emotional validation      Recommendations/Plan: Outpatient psychiatry. Patient requested this and agreed with plan. The writer scheduled a psychiatric appointment with the patient. See the appointment details below. [facility] referral placed as well. The writer put appointment information, along with a resource, and her contact information in the patient's MyChart. Provided the patient the appointment information in video chat during the appointment as well.  The writer told the patient to reach out if she wanted any resources or had other questions.  Patient verbalized understanding.     Referrals: [facility] referral placed and   PSYCHIATRIC APPOINTMENT SCHEDULED  Date: Monday, 8/15/2022  Time: 1:30 pm - 2:30 pm  Provider: Katlin Ya MA, PA-C  Location: [facility], [address], [address], [place] [address]  Phone: [phone]  Type: Telepsychiatry  Patient Instructions: Please fill New Patient Form by using following link or call us to request link to be sent to you. [email or web address] (1) All forms need to be completed AT LEAST 96 HOURS prior to appointment. (2) Please call us at [phone] - 96 HOURS prior to your scheduled appointment to confirm that you are able to attend. We will provide you information about how to log into video call software when you call.    Landy Mcqueen, [facility], LADC July 13, 2022  Mental Health and Addiction Services Assessment Center

## 2022-07-13 NOTE — PATIENT INSTRUCTIONS
Olivia,  It was a pleasure meeting with you today. I put in a referral for [facility] to call you to set up a psychiatric appointment with them in the meantime until your scheduled appointment with [facility], Katlin Ya MA, PA-C, who will be your long term provider. They should call you today or tomorrow. Their telephone number is Transition Clinic [phone]. [facility] appointment details are below. I also put a resource below. If you have any questions or want more resources do not hesitate to reach out.     PSYCHIATRIC APPOINTMENT SCHEDULED  Date: Monday, 8/15/2022  Time: 1:30 pm - 2:30 pm  Provider: Katlin Ya MA, PA-C  Location: [facility], [address] [place] [address], [address], [place] [address]  Phone: [phone]  Type: Telepsychiatry  Patient Instructions: Please fill New Patient Form by using following link or call us to request link to be sent to you. [email or web address] (1) All forms need to be completed AT LEAST 96 HOURS prior to appointment. (2) Please call us at [phone] - 96 HOURS prior to your scheduled appointment to confirm that you are able to attend. We will provide you information about how to log into video call software when you call.    RESOURCE  [facility] (free short term therapy)  Website: [email or web address]    Landy Snowden, BRIGIDA, LADC  Licensed Psychotherapist  [facility]  Mental Health and Addiction Services Assessment [facility] [email or web address]  [email or web address]  Office: [phone]  Fax: [phone]  Gender pronouns: she/her/hers  Employed by: [facility]

## 2022-07-15 ENCOUNTER — TELEPHONE (OUTPATIENT)
Dept: BEHAVIORAL HEALTH | Facility: CLINIC | Age: 22
End: 2022-07-15

## 2022-07-15 NOTE — TELEPHONE ENCOUNTER
First attempt to contact pt. Writer left a VM with TC contact info and encouraged a phone call back to schedule initial psychiatry appointment. Writer will postpone for tomorrow.    Layne Pena  07/15/22  [phone]    ----- Message from Nuno Don RN sent at 7/15/2022 11:53 AM CDT -----  Regarding: FW: Referral   Mental Health &Addiction (MH&A)Transition Clinic (TC):     Provides Patient Support While Waiting to Access Programmatic and Outpatient MH&A Care and Provides Select Crisis Assessment Services     NURSING Referral Review  _________________________________________    This RN has reviewed this Medication Management referral to the Transition Clinic and deemed the referral    Appropriate X   Inappropriate   Consulting     Based on the following criteria:    Pt has a psychiatric provider (or pending plan) in place for future prescribing: Yes:     Next Level of Care Patient Will Be Transitioned To:Date: Monday, 8/15/2022  Time: 1:30 pm - 2:30 pm   Provider: Katlin Ya MA, PA-C   Location: [facility], [address], [address], [place] [address]   Phone: [phone]   Type: Telepsychiatry      Timeframe until pt's scheduled psychiatry appointment is less than 6 months: Yes: ~ 1 month     Pt takes psychiatric medications: None indicated.    Pt's goals seem to align with this temporary service: Yes: Transition Clinic to bridge psychiatric care and psychiatric medication management until next Level of Care.         Any additional pertinent information regarding this referral: [facility]. Recent ED visit for dysuria.  Psychiatry to manage anxiety.      Initial contact w/ patient/parent: TC Coordinator to contact this patient/patients guardian to schedule a New Person Visit with TC Provider Griselda Woodard.      The Transition Clinic phone # is [phone].    Nuno Don RN on July 15, 2022 at 11:43 AM          Additional Scheduling  Instructions for Transition Clinic Coordinator:   TC Coordinators:  This is a medication only Referral.          Please schedule this patient with TC Provider Griselda Woodard in the next 2-3 weeks or as indicated by the patient.       RN Signature  Nuno Don RN on 7/15/2022 at 11:52 AM  ----- Message -----  From: Layne Pena  Sent: 7/13/2022   2:38 PM CDT  To: [facility] Karen Nguyen  Subject: FW: Referral                                     Hello,    Med only-    Next Level of Care Patient Will Be Transitioned To:Date: Monday, 8/15/2022  Time: 1:30 pm - 2:30 pm   Provider: Katlin Ya MA, PA-C   Location: [facility], [address] [place] [address], [address] [address]   Phone: [phone]   Type: Telepsychiatry       Thank you!  ----- Message -----  From: Landy Mcqueen LPCC  Sent: 7/13/2022   2:36 PM CDT  To: [facility]  Subject: Referral                                         Transition Clinic Referral   [place] Only   Limited [place] Availability    Type of Referral:    ____Therapy Only    ___X_ Medication Only: Referral will automatically be declined if no next level of care scheduled. Suboxone and Opioid management referrals are automatically denied.  ____Therapy & Medication:  Referral will automatically be declined if no next level of care scheduled. Suboxone and Opioid management referrals are automatically denied.  ____Diagnostic Assessment     Suboxone and Opioid Management Referrals are Automatically Denied    TC Psychiatry cannot see patients who do not have active medical insurance    Referring Provider Name: BRIGIDA Aguilar, [facility]    Clinician completing the assessment. Same as above    Referring Provider: OTHER: [facility]    If known, referring provider contact name: same as above; Phone Number: [phone]  Service Line/Location: [facility] [place]/[facility]    Reason for Transition Clinic Referral: bridging care until  psychiatric appointment scheduled    Next Level of Care Patient Will Be Transitioned To:Date: Monday, 8/15/2022  Time: 1:30 pm - 2:30 pm  Provider: Katlin Ya MA, PA-C  Location: [facility], [address] [place] [address], [address], [place] [address]  Phone: [phone]  Type: Telepsychiatry    What Would Be Helpful from the Transition Clinic: seeking medication for anxiety.     Needs: NO    Does Patient Have Access to Technology: yes    Patient E-mail Address: [email or web address]    Current Patient Phone Number: [phone]    Clinician Gender Preference (if applicable): YES: female, if able.     Landy Mcqueen, LPCC, LADC    Thanks!

## 2022-07-15 NOTE — TELEPHONE ENCOUNTER
Mental Health &Addiction (MH&A)Transition Clinic (TC):     Provides Patient Support While Waiting to Access Programmatic and Outpatient MH&A Care and Provides [facility] Crisis Assessment Services     NURSING Referral Review  _________________________________________    This RN has reviewed this Medication Management referral to the Transition Clinic and deemed the referral   [x] Appropriate  [] Inappropriate   []Consulting     Based on the following criteria:    Pt has a psychiatric provider (or pending plan) in place for future prescribing: Yes:     Next Level of Care Patient Will Be Transitioned To:Date: Monday, 8/15/2022  Time: 1:30 pm - 2:30 pm   Provider: Katlin Ya MA, PA-C   Location: [facility], [address], [address], [place] [address]   Phone: [phone]   Type: Telepsychiatry      Timeframe until pt's scheduled psychiatry appointment is less than 6 months: Yes: ~ 1 month     Pt takes psychiatric medications: None indicated.    Pt's goals seem to align with this temporary service: Yes: Transition Clinic to bridge psychiatric care and psychiatric medication management until next Level of Care.         Any additional pertinent information regarding this referral: [facility]. Recent ED visit for dysuria.  Psychiatry to manage anxiety.      Initial contact w/ patient/parent: TC Coordinator to contact this patient/patients guardian to schedule a New Person Visit with TC Provider Griselda Woodard.      The Transition Clinic phone # is [phone].    Nuno Don RN on July 15, 2022 at 11:43 AM          Additional Scheduling Instructions for Transition Clinic Coordinator:   TC Coordinators:  This is a medication only Referral.          Please schedule this patient with TC Provider Griselda Woodard in the next 2-3 weeks or as indicated by the patient.      TC Coordinator, please educate this (patient/ parent/guardian/facility staff member ) as to  "the purpose and benefit of the TC.      \"The Transition Clinic is a Temporary Service that helps to bridge the time to your next appointment.  It is not intended to be a long-term service and you are expecxted to attend your scheduled appointment with your next provider.      Patient/Parent/ Facility Staff Member verbalized understanding     If you need support between appointments, please call [phone] and let them know you're seen by Transition [facility] Psychiatry.  You may also send a [employer] message to reach us.       RN Signature  Nuno Don RN on 7/15/2022 at 11:52 AM            Layne Pena Transition [facility] Rn Pool  Hello,     Med only-     Next Level of Care Patient Will Be Transitioned To:Date: Monday, 8/15/2022  Time: 1:30 pm - 2:30 pm   Provider: Katlin Ya MA, PA-C   Location: [facility], [address], [address], [place] [address]   Phone: [phone]   Type: Telepsychiatry       Thank you!             Previous Messages       ----- Message -----   From: Landy Mcqueen LPCC   Sent: 7/13/2022   2:36 PM CDT   To: [facility]   Subject: Referral                                         Transition Clinic Referral   [place] Only   Limited [place] Availability     Type of Referral:     ____Therapy Only     ___X_ Medication Only: Referral will automatically be declined if no next level of care scheduled. Suboxone and Opioid management referrals are automatically denied.   ____Therapy & Medication:  Referral will automatically be declined if no next level of care scheduled. Suboxone and Opioid management referrals are automatically denied.   ____Diagnostic Assessment     Suboxone and Opioid Management Referrals are Automatically Denied     TC Psychiatry cannot see patients who do not have active medical insurance     Referring Provider Name: BRIGIDA [facility], [facility]     Clinician completing the assessment. Same as above     Referring " Provider: OTHER: [facility]     If known, referring provider contact name: same as above; Phone Number: [phone]   Service Line/Location: [facility], [place]/[facility]     Reason for Transition Clinic Referral: bridging care until psychiatric appointment scheduled     Next Level of Care Patient Will Be Transitioned To:Date: Monday, 8/15/2022  Time: 1:30 pm - 2:30 pm   Provider: Katlin Ya MA, PA-C   Location: [facility], [address], [address] [address]   Phone: [phone]   Type: Telepsychiatry     What Would Be Helpful from the Transition Clinic: seeking medication for anxiety.      Needs: NO     Does Patient Have Access to Technology: yes     Patient E-mail Address: [email or web address]     Current Patient Phone Number: [phone]     Clinician Gender Preference (if applicable): YES: female, if able.     Landy Mcqueen, LPCC, LADC     Thanks!

## 2022-07-16 ENCOUNTER — TELEPHONE (OUTPATIENT)
Dept: BEHAVIORAL HEALTH | Facility: CLINIC | Age: 22
End: 2022-07-16

## 2022-07-16 NOTE — TELEPHONE ENCOUNTER
Writer left message re: scheduling tc psychiatry. Referral notified and done'd.    DDebbie Faust  Care Coordinator  7.16  ----- Message from Nuno Don RN sent at 7/15/2022 11:53 AM CDT -----  Regarding: FW: Referral   Mental Health &Addiction (MH&A)Transition Clinic (TC):     Provides Patient Support While Waiting to Access Programmatic and Outpatient MH&A Care and Provides Select Crisis Assessment Services     NURSING Referral Review  _________________________________________    This RN has reviewed this Medication Management referral to the Transition Clinic and deemed the referral    Appropriate X   Inappropriate   Consulting     Based on the following criteria:    Pt has a psychiatric provider (or pending plan) in place for future prescribing: Yes:     Next Level of Care Patient Will Be Transitioned To:Date: Monday, 8/15/2022  Time: 1:30 pm - 2:30 pm   Provider: Katlin Ya MA, PA-C   Location: [facility], [address], [place] [address]   Phone: [phone]   Type: Telepsychiatry      Timeframe until pt's scheduled psychiatry appointment is less than 6 months: Yes: ~ 1 month     Pt takes psychiatric medications: None indicated.    Pt's goals seem to align with this temporary service: Yes: Transition Clinic to bridge psychiatric care and psychiatric medication management until next Level of Care.         Any additional pertinent information regarding this referral: [facility]. Recent ED visit for dysuria.  Psychiatry to manage anxiety.      Initial contact w/ patient/parent: TC Coordinator to contact this patient/patients guardian to schedule a New Person Visit with TC Provider Griselda Woodard.      The Transition Clinic phone # is [phone].    Nuno Don RN on July 15, 2022 at 11:43 AM          Additional Scheduling Instructions for Transition Clinic Coordinator:   TC Coordinators:  This is a medication only  Referral.          Please schedule this patient with TC Provider Griselda Woodard in the next 2-3 weeks or as indicated by the patient.       RN Signature  Nuno Don RN on 7/15/2022 at 11:52 AM  ----- Message -----  From: Layne Pena  Sent: 7/13/2022   2:38 PM CDT  To: [facility] Karen Nguyen  Subject: FW: Referral                                     Hello,    Med only-    Next Level of Care Patient Will Be Transitioned To:Date: Monday, 8/15/2022  Time: 1:30 pm - 2:30 pm   Provider: Katlin SMARTC   Location: [facility], [address] [place] [address], [address], [place] [address]   Phone: [phone]   Type: Telepsychiatry       Thank you!  ----- Message -----  From: Landy Mcqueen LPCC  Sent: 7/13/2022   2:36 PM CDT  To: [facility]  Subject: Referral                                         Transition Clinic Referral   [place] Only   Limited [place] Availability    Type of Referral:    ____Therapy Only    ___X_ Medication Only: Referral will automatically be declined if no next level of care scheduled. Suboxone and Opioid management referrals are automatically denied.  ____Therapy & Medication:  Referral will automatically be declined if no next level of care scheduled. Suboxone and Opioid management referrals are automatically denied.  ____Diagnostic Assessment     Suboxone and Opioid Management Referrals are Automatically Denied    TC Psychiatry cannot see patients who do not have active medical insurance    Referring Provider Name: BRIGIDA Aguilar, [facility]    Clinician completing the assessment. Same as above    Referring Provider: OTHER: [facility]    If known, referring provider contact name: same as above; Phone Number: [phone]  Service Line/Location: [facility], [place]    Reason for Transition Clinic Referral: bridging care until psychiatric appointment scheduled    Next Level of Care Patient Will Be Transitioned To:Date:  Monday, 8/15/2022  Time: 1:30 pm - 2:30 pm  Provider: Katlin SMARTC  Location: [facility], [address] [place] [address], [address], [place] [address]  Phone: [phone]  Type: Telepsychiatry    What Would Be Helpful from the Transition Clinic: seeking medication for anxiety.     Needs: NO    Does Patient Have Access to Technology: yes    Patient E-mail Address: [email or web address]    Current Patient Phone Number: [phone]    Clinician Gender Preference (if applicable): YES: female, if able.     Landy Mcqueen, LPCC, LADC    Thanks!

## 2022-07-21 ENCOUNTER — TELEPHONE (OUTPATIENT)
Dept: BEHAVIORAL HEALTH | Facility: CLINIC | Age: 22
End: 2022-07-21

## 2022-07-21 NOTE — TELEPHONE ENCOUNTER
Writer spoke with patient and scheduled initial psychiatry appointment for 08/04/22 @ 2:30 pm with Griselda Woodard. Tracker completed.    Layne Pena  07/21/22  [phone]

## 2022-08-03 ASSESSMENT — ANXIETY QUESTIONNAIRES
3. WORRYING TOO MUCH ABOUT DIFFERENT THINGS: SEVERAL DAYS
7. FEELING AFRAID AS IF SOMETHING AWFUL MIGHT HAPPEN: SEVERAL DAYS
IF YOU CHECKED OFF ANY PROBLEMS ON THIS QUESTIONNAIRE, HOW DIFFICULT HAVE THESE PROBLEMS MADE IT FOR YOU TO DO YOUR WORK, TAKE CARE OF THINGS AT HOME, OR GET ALONG WITH OTHER PEOPLE: SOMEWHAT DIFFICULT
4. TROUBLE RELAXING: SEVERAL DAYS
GAD7 TOTAL SCORE: 9
5. BEING SO RESTLESS THAT IT IS HARD TO SIT STILL: NOT AT ALL
GAD7 TOTAL SCORE: 9
6. BECOMING EASILY ANNOYED OR IRRITABLE: MORE THAN HALF THE DAYS
8. IF YOU CHECKED OFF ANY PROBLEMS, HOW DIFFICULT HAVE THESE MADE IT FOR YOU TO DO YOUR WORK, TAKE CARE OF THINGS AT HOME, OR GET ALONG WITH OTHER PEOPLE?: SOMEWHAT DIFFICULT
GAD7 TOTAL SCORE: 9
1. FEELING NERVOUS, ANXIOUS, OR ON EDGE: MORE THAN HALF THE DAYS
7. FEELING AFRAID AS IF SOMETHING AWFUL MIGHT HAPPEN: SEVERAL DAYS
2. NOT BEING ABLE TO STOP OR CONTROL WORRYING: MORE THAN HALF THE DAYS

## 2022-08-04 ENCOUNTER — VIRTUAL VISIT (OUTPATIENT)
Dept: BEHAVIORAL HEALTH | Facility: CLINIC | Age: 22
End: 2022-08-04
Payer: COMMERCIAL

## 2022-08-04 DIAGNOSIS — F41.9 ANXIETY DISORDER, UNSPECIFIED TYPE: Primary | ICD-10-CM

## 2022-08-04 DIAGNOSIS — F43.22 ADJUSTMENT DISORDER WITH ANXIOUS MOOD: ICD-10-CM

## 2022-08-04 PROCEDURE — 99205 OFFICE O/P NEW HI 60 MIN: CPT | Mod: 95 | Performed by: NURSE PRACTITIONER

## 2022-08-04 RX ORDER — DULOXETIN HYDROCHLORIDE 20 MG/1
20 CAPSULE, DELAYED RELEASE ORAL DAILY
Qty: 30 CAPSULE | Refills: 1 | Status: SHIPPED | OUTPATIENT
Start: 2022-08-04 | End: 2023-01-05

## 2022-08-04 NOTE — PATIENT INSTRUCTIONS
Start:  Duloxetine/Cymbalta 20 mg daily    ----------------------    -You do not need to return to the Transition Clinic for medication management  -Please make sure to keep the appointment on:  8/16/2022  for longitudinal outpatient psychiatry services

## 2022-08-04 NOTE — PROGRESS NOTES
" This video/telephone visit will be conducted virtually between you and your provider. We have found that certain health care needs can be provided without the need for an in-person physical exam. This service lets us provide the care you need with a video /telephone conversation. If a prescription is necessary we can send it directly to your pharmacy. If lab work is needed we can place an order for that and you can then stop by our lab to have the test done at a later time.\"   Just as we bill insurance for in-person visits, we also bill insurance for video/telephone visits. If you have questions about your insurance coverage, we recommend that you speak with your insurance company.      Patient/Parent has given verbal consent for video/Telephone visit? yes    Patient would like the video visit invitation sent by: Heald Collegenallely    Patient verified allergies, medications and pharmacy via phone. Patient states they are ready for visit.      Mental Health &Addiction (MH&A)Transition Clinic (TC):     Provides Patient Support While Waiting to Access Programmatic and Outpatient MH&A Care and Provides Select Crisis Assessment Services     INTAKE  ____________________________________________________    \"The Transition Clinic is a temporary psychiatry service that helps to bridge the time to your next appointment. It is not intended to be a long-term service and you are expected to attend your scheduled appointment with your next provider.\"  [x] Patient/Parent verbalized understanding    If you need support between appointments, please call [phone] and let them know you're seen by [facility] Psychiatry. You may also send a [employer] message to reach us.    General-     Most pressing MH needs at this time: anxiety management      Any physical health conditions or diagnoses we should be aware of or that are impacting you: Recent UTI      Medications-     Injectable medications currently prescribed: None  If yes, do you " need an appointment for the next injection: NA    Any Controlled Substances that you are prescribed: None      Primary care provider: Aditi Byrne MD        IRWIN-7 scores: 9    IRWIN-7 SCORE 7/12/2022 8/3/2022   Total Score 8 (mild anxiety) 9 (mild anxiety)   Total Score 8 9       PHQ-9 scores: 5  PHQ-9 SCORE 7/13/2022 8/3/2022   PHQ-9 Total Score MyChart 5 (Mild depression) 5 (Mild depression)   PHQ-9 Total Score 5 5           Anything the provider should be aware of for today's appointment: on Macrobid for UTI    New (awaiting) Mental health provider or next programming: danika/Katlin Ya  @ [facility]    Date of scheduled apt: 8/16/22        Ethel Shearer on August 4, 2022 at 2:01 PM     MH&A TC   NURSING Post-Appointment Chart-check:    Correct pharmacy verified with patient and updated in chart? [x] yes []no    Charge captured ? [] yes  [x] no    Medications ordered this visit were e-scribed.  Verified by order class [x] yes  [] no    List Medications:   DULoxetine (CYMBALTA) 20 MG capsule  Class: E-Prescribe    Medication changes or discontinuations were communicated to patient's pharmacy: [] yes  [x] no    UA collected [] yes  [] no  [x] n/a-virtual     Future appointment was made: [] yes  [x] no  [] n/a    Dictation completed at time of chart check: [] yes  [x] no    I have checked the documentation for today s encounters and the above information has been reviewed and completed.      Ethel Shearer on August 4, 2022 at 3:46 PM

## 2022-08-04 NOTE — PROGRESS NOTES
"[facility]      Mental Health & Addiction Service Line    Transition Clinic: Psychiatry Note  Medication Management              Charts/documentation read prior to the appointment:  -2022        VISIT INFORMATION    Date:  2022     Number:  -Initial     Referral source:  -Bridging to long term outpatient psychiatry      Patient Identifying Information:  Legal name: Olivia Stanley  Preferred name: Olivia  : 2000  Preferred pronouns: She/her      Participants:   -Patient  -Provider      Telehealth visit details:  Type of service:  Video  Patient location:  At home  Provider Location:  [facility]  Platform utilized:  Amwell for a few minutes then changed to [email or web address] due to IT issues    Start time: 2:40 pm  End time:  3:26 pm      HPI    Copied/Pasted from 2022 [facility]:    The reason for seeking services at this time is: \"Anxiety\".  Patient reports she is interested in medications. Patient reports she met with a psychiatrist when she was younger but that was brief. The problem(s) began 2004.   Patient has attempted to resolve these concerns in the past by therapy. Patient states, I have always been an anxious person since being a kid. I think my parents should have done something but what is done is done. It is more manageable then it was as a kid. Patient reports periods of feeling okay and periods that are worse. Patient states, feel like I am doing a bad job with my partner or at work, or that everyone hates me. Patient states, I just graduated recently and started my new job. Patient reports she does not want things to get worse.      ------------------------------------    -So far like my job  -Enjoy my co-workers ... they are in my age group and we have similar background    -Boss is a bit stressful ... she has high expectations and needs to get a lot of people trained in,   so she wants to be productive, " doesn't joke around much which I get        PSYCHIATRIC ROS    Sleep:   -11:30 pm to 6:00 am  -Feel rested on this amount        Appetite/Weight Changes:   -Denies unintentional loss or gain > 10 lbs in the past 4 weeks    ---------------------    -Little down  -Food isn't isn't as enticing, don't snack as much in the afternoons or evening      Energy Levels:   -Don't have quite as much energy in the evenings  -Used to be more creative and now I'm a bit of a couch potatoe         Trauma hx and or PTSD:   -No P/E/S abuse in childhood or adult relationships         Depression/Anxiety:   -See PHQ-9 score    -See IRWIN-7 score        Eating Disorder:   -No former dx     -Within the past 12 months denies: calorie restriction, bingeing/purging, intentional use of laxatives or exercising in excess      Liliya/Hypomania:   -No hx of 7+ consecutive days of symptoms during periods of sobriety        Psychotic Symptoms:   -No hx of:  AH/VH, delusions, paranoia, thought insertion or broadcasting during periods of sobriety      Suicidal ideations:   +Passive SI intermittently  -Denies intent or plan      SIB:  -Denies hx or current engagement of self harm       Side effects:  -Not currently on any psychotropics              MENTAL HEALTH HISTORY    Individual therapy:  -Yes in the past    Suicide attempts:  -None reported     Inpatient psychiatric hospitalizations:  -None reported   -No hx of commitments       ECT:  -None reported         Medication Trials:  -None reported          SUBSTANCE USE    Prior use:  -Denies any history of alcohol or recreational substances resulting in legal issues,   c/d programming, or withdrawal symptoms    -See 7/13/2022 encounter for full summarization of use          Current use:    Alcohol:   -1 to 2 beers per sitting, 2 to 4x week    Recreational Drugs:   -THC edibles 1 or 2x/week      Medical Marijuana:  -None reported       Cigarettes per day:   -None reported       Chewing tobacco:   -None  reported       Vaping:    -None reported       Caffeine intake:    -Avoid daily use  -Drink it if I'm on call in the workplace            SOCIAL HISTORY  -Was reviewed within the EMR per: 7/13/2022 encounter by [facility]        MEDICAL HISTORY    Current:  -The problem list was reviewed prior to the appointment  -The patient denies any concerning physical and or medical symptoms during the interviewing process      Developmental:   -Mother had normal pregnancy: Yes  -Met age appropriate milestones: Yes  -Participated in special education classes and or had an IEP: No  -Hx of autism spectrum disorder, learning disability, and or other cognitive disorder: No      Neurological:  -Denies any hx of: seizures, concussions, or TBI        MEDICATIONS      Current Outpatient Medications:      levonorgestrel-ethinyl estradiol (AVIANE,ALESSE,LESSINA) 0.1-20 mg-mcg per tablet, [LEVONORGESTREL-ETHINYL ESTRADIOL (AVIANE,ALESSE,LESSINA) 0.1-20 MG-MCG PER TABLET] Take 1 tablet by mouth daily., Disp: 3 Package, Rfl: 4     misoprostol (CYTOTEC) 200 MCG tablet, Insert close to vagina night before the procedure (Patient not taking: Reported on 11/4/2021), Disp: 1 tablet, Rfl: 0     nitroFURantoin macrocrystal-monohydrate (MACROBID) 100 MG capsule, One tab before sex for preventing recurrent UT, Disp: 30 capsule, Rfl: 1          If a controlled substance has been prescribed during the appointment:    -The [place] Prescription Monitoring Program has been reviewed and there are no current concerns with: diversionary activity, early refill requests, and or obtaining the medication from multiple providers.          VITALS    BP Readings from Last 3 Encounters:   11/04/21 104/66   01/29/21 110/62       Pulse Readings from Last 3 Encounters:   11/04/21 68   01/29/21 72       Wt Readings from Last 3 Encounters:   11/04/21 61.6 kg (135 lb 11.2 oz)   01/29/21 60.2 kg (132 lb 12.8 oz)               LABS    The following have been reviewed  prior to or during the appointment:  -None          SCALES      PHQ 1/29/2021 7/13/2022 8/3/2022   PHQ-9 Total Score 0 5 5   Q9: Thoughts of better off dead/self-harm past 2 weeks Not at all Not at all Not at all        IRWIN-7 SCORE 1/29/2021 7/12/2022 8/3/2022   Total Score - 8 (mild anxiety) 9 (mild anxiety)   Total Score 0 8 9        Answers for HPI/ROS submitted by the patient on 8/3/2022  If you checked off any problems, how difficult have these problems made it for you to do your work, take care of things at home, or get along with other people?: Somewhat difficult  PHQ9 TOTAL SCORE: 5  IRWIN 7 TOTAL SCORE: 9        MENTAL STATUS EXAMINATION    Appearance: Adequately Groomed, Attire Appropriate for the Season  General Behavior:  Cooperative, Direct Eye Contact  Speech: Fluent, Normal rate and volume  Musculoskeletal:    -Gait not observed during t.h. visit  -No facial tics/tremors observed   -Motor coordination is grossly intact   Mood: Anxious   Affect: Appropriate to Content of Speech and Circumstances; a bit flat  Attention: Intact  Orientation:  Person, Place, Time, Situation  Thought Associations:  Intact  Thought Content: Reality based   Thought Processes: Organized, Normal rate  Memory: No overt impairment; no screenings or formal testing performed  Language: Intact  Judgement: Fair to Good  Insight: Fair to Good        ASSESSMENT/CLINICAL IMPRESSIONS    Summary:    Olivia Stanley is a 21 y/o female with the history of: Anxiety Disorder NOS.    Is attending today's appointment for management of psychotropics/bridging to long term outpatient psychiatry services.    Per chart review, has started a new job after graduating college and has been drinking alcohol more frequently than previously consumed;  also utilizes THC edibles a few times per week.    Thus far has never tried any medications to manage mental health therefore initiated a low dosage of Cymbalta.  Reviewed writer is unable to provide guidance  on potential drug interactions with THC (given variable strengths/concentrations of various products), therefore ultimately the recommendation is to avoid altogether.     Additionally should follow CDC guidelines 1 drink or less per day surrounding alcohol consumption for women.    Denies any immediate safety concerns towards self or others and is future oriented/forward thinking throughout.          DSM-V and or working diagnosis:    1. Anxiety Disorder NOS    2. Adjustment Disorder with anxious mood      Rule outs:    3. Monitor for any emerging DIMAS          SAFETY EVALUATION:  Suicidal ideations:  -denies  Homicidal ideations:  -denies  Risk factors:  -age  -recent increase in mental health symptoms  Protective and mitigating factors:  -significant other, family, friends  -no prior attempts  Risk assessment:  -low            TREATMENT PLAN      Medications:  Start:  Duloxetine/Cymbalta 20 mg daily      Labs:  -None Obtained        Therapy:  -Consider re-engaging in individual therapy again        Non-pharmacological modalities:  -Minimize use of alcohol and THC when taking antidepressants          Return to Clinic or Referrals:  -You do not need to return to the Transition Clinic for medication management  -Please make sure to keep the appointment on:  8/16/2022  for longitudinal outpatient psychiatry services        Total time:  61 minutes per:    -Review of EMR   -Appointment time  -Documentation           Griselda ROMANO-CNP,  [facility]          --------------------------------------------------------------------------------------------------------------------------        TREATMENT RISK STATEMENT    The risks, benefits, alternatives, and potential adverse effects have been explained and are understood by the patient.  The patient agrees to the treatment plan with their ability to do so.      The patient knows to call the clinic: [phone]  for any problems or concerns until the next psychiatry visit,  regardless if it is within or outside of the [facility] system.     If unable to reach clinic staff (via phone call or medical messaging) during the normal business hours: 8:00 am to 4:30 pm then it is recommended accessing the nearest: emergency department, urgent care facility, or utilizing local (varies based on county of residence) and national crisis #'s or text messaging services for immediate assistance.          --------------------------------------------------------------------------------------------------------------------------        If applicable the following has been discussed with the patient, parent/guardian, and or attending family member during the appointment:      1. Risks of polypharmacy and possible drug interactions with current medication list + common OTC products, herbs, and supplements.    Moving forward, it is suggested to intermittently check-in with a clinic or retail pharmacist whenever new medications or OTC/h/s are consumed.    2. Recommendation to adhere to CDC guidelines as it relates alcohol consumption.  If taking benzodiazepines, you should abstain from alcohol intake due to increased risks of CNS and respiratory depression, as well as psychomotor impairment.    3. If possible, it is recommended to avoid concurrent use of prescribed:  opioids  +  benzodiazepines due to increased risks of CNS and respiratory depression, as well as the increased risk of overdose.     4. Recommendation to minimize and or abstain from THC use (unless the pt. is prescribed medical marijuana).    5. Recommendation to abstain from illicit substances including but not limited to the following: heroin, street fentanyl, cocaine, methamphetamines, and bath salts.    6. Do not take opioids, stimulants, and or other prescription medications unless they are specifically prescribed for you.    7. Recommendation to abstain from: alcohol,  tobacco/smoking, vaping, THC, and all illicit substances if  trying to become or are pregnant.    8. Black Box Warnings associated with the prescribed psychotropic(s).    9. Potential adverse effects of antipsychotics including but not limited to the following: weight gain, metabolic syndrome, EPS/Tardive Dyskinesias.    10. Potential CV and neurological adverse effects of stimulants including but not limited to the following:  sudden death, MI, stroke, HTN, cardiomyopathy (long term use) as well as seizures.

## 2022-09-24 ENCOUNTER — HEALTH MAINTENANCE LETTER (OUTPATIENT)
Age: 22
End: 2022-09-24

## 2023-01-05 ENCOUNTER — VIRTUAL VISIT (OUTPATIENT)
Dept: FAMILY MEDICINE | Facility: CLINIC | Age: 23
End: 2023-01-05
Payer: COMMERCIAL

## 2023-01-05 DIAGNOSIS — N39.0 RECURRENT UTI: ICD-10-CM

## 2023-01-05 DIAGNOSIS — R45.86 LABILE MOOD: Primary | ICD-10-CM

## 2023-01-05 PROCEDURE — 99213 OFFICE O/P EST LOW 20 MIN: CPT | Mod: 95 | Performed by: PHYSICIAN ASSISTANT

## 2023-01-05 RX ORDER — NITROFURANTOIN 25; 75 MG/1; MG/1
CAPSULE ORAL
Qty: 30 CAPSULE | Refills: 1 | Status: SHIPPED | OUTPATIENT
Start: 2023-01-05 | End: 2024-03-11

## 2023-01-05 RX ORDER — ESCITALOPRAM OXALATE 5 MG/1
10 TABLET ORAL DAILY
COMMUNITY
Start: 2023-01-05 | End: 2024-03-11

## 2023-01-05 RX ORDER — LAMOTRIGINE 25 MG/1
50 TABLET ORAL DAILY
COMMUNITY
Start: 2023-01-05

## 2023-01-05 NOTE — PATIENT INSTRUCTIONS
Augustine Baker,    Thank you for allowing [facility] to manage your care.    I am unsure of the cause of your symptoms, but we will see what our workup shows.     If you develop worsening/changing symptoms at any time, please call 911 or go to the emergency department for evaluation.    I ordered some lab work, please go to the [facility] on 1/10/23 at 1pm for a 1:15pm blood draw.    I sent your prescriptions to your pharmacy.    Please allow 1-2 business days for our office to contact you in regards to your laboratory/radiological studies.  If not done so, I encourage you to login into [employer] ([email or web address] to review your results as well.     If you have any questions or concerns, please feel free to call us at [phone]    Sincerely,    Diogo Pittman PA-C    Did you know?      You can schedule a video visit for follow-up appointments as well as future appointments for certain conditions.  Please see the below link.     [email or web address]    If you have not already done so,  I encourage you to sign up for [email or web address] ([email or web address]).  This will allow you to review your results, securely communicate with a provider, and schedule virtual visits as well.

## 2023-01-05 NOTE — PROGRESS NOTES
Olivia is a 22 year old who is being evaluated via a billable telephone visit.      What phone number would you like to be contacted at? [phone]  How would you like to obtain your AVS? Francesca  Distant Location (provider location):  On-site    Assessment & Plan   Problem List Items Addressed This Visit        Urinary    Recurrent UTI    Relevant Medications    nitroFURantoin macrocrystal-monohydrate (MACROBID) 100 MG capsule   Other Visit Diagnoses     Labile mood    -  Primary    Relevant Orders    TSH with free T4 reflex         I will refill her prescription for nitrofurantoin for UTI prophylaxis after intercourse.  No side effects of the medications or symptoms currently.  TSH is pending to look for mood lability.  She contracts for safety verbally.  Mental health medications managed by another provider.  No changes today. Has not had TSH checked previously.    DDx and Dx discussed with and explained to the pt to their satisfaction.  All questions were answered at this time. Pt expressed understanding of and agreement with this dx, tx, and plan. No further workup warranted and standard medication warnings given. I have given the patient a list of pertinent indications for re-evaluation. Will go to the Emergency Department if symptoms worsen or new concerning symptoms arise. Patient left the call in no apparent distress.     Ordering of each unique test  Prescription drug management  12 minutes spent on the date of the encounter doing chart review, history and exam, documentation and further activities per the note     See Patient Instructions    Return for a recheck as needed.    SHAUNA Murphy  [facility] SHAREE Baker is a 22 year old presenting for the following health issues:  Recheck Medication and Referral (Thyroid )      History of Present Illness       Reason for visit:  Need a refill of my antibiotic for UTI prevention. I would also like a referral to have my  thyroid checked due to my mood swings.    She eats 0-1 servings of fruits and vegetables daily.She consumes 0 sweetened beverage(s) daily.She exercises with enough effort to increase her heart rate 9 or less minutes per day.  She exercises with enough effort to increase her heart rate 3 or less days per week.   She is taking medications regularly.     Currently on lamotrigine and escitalopram for mood and doing okay.    Review of Systems   Constitutional, HEENT, cardiovascular, pulmonary, gi and gu systems are negative, except as otherwise noted.      Objective     Vitals:  No vitals were obtained today due to virtual visit.    Physical Exam   healthy, alert and no distress  PSYCH: Alert and oriented times 3; coherent speech, normal   rate and volume, able to articulate logical thoughts, able   to abstract reason, no tangential thoughts, no hallucinations   or delusions  Her affect is normal and pleasant. Denies thoughts of self harm or harming others.  RESP: No cough, no audible wheezing, able to talk in full sentences  Remainder of exam unable to be completed due to telephone visits    TSH pending.     Phone call duration: 10 minutes

## 2023-01-10 ENCOUNTER — LAB (OUTPATIENT)
Dept: LAB | Facility: CLINIC | Age: 23
End: 2023-01-10
Payer: COMMERCIAL

## 2023-01-10 DIAGNOSIS — R45.86 LABILE MOOD: ICD-10-CM

## 2023-01-10 LAB — TSH SERPL DL<=0.005 MIU/L-ACNC: 1.29 UIU/ML (ref 0.3–4.2)

## 2023-01-10 PROCEDURE — 84443 ASSAY THYROID STIM HORMONE: CPT

## 2023-01-10 PROCEDURE — 36415 COLL VENOUS BLD VENIPUNCTURE: CPT

## 2023-03-01 ENCOUNTER — NURSE TRIAGE (OUTPATIENT)
Dept: NURSING | Facility: CLINIC | Age: 23
End: 2023-03-01
Payer: COMMERCIAL

## 2023-05-08 ENCOUNTER — HEALTH MAINTENANCE LETTER (OUTPATIENT)
Age: 23
End: 2023-05-08

## 2023-12-01 ENCOUNTER — VIRTUAL VISIT (OUTPATIENT)
Dept: FAMILY MEDICINE | Facility: CLINIC | Age: 23
End: 2023-12-01
Payer: COMMERCIAL

## 2023-12-01 DIAGNOSIS — A60.04 HERPES SIMPLEX VULVOVAGINITIS: Primary | ICD-10-CM

## 2023-12-01 PROCEDURE — 99213 OFFICE O/P EST LOW 20 MIN: CPT | Mod: VID | Performed by: FAMILY MEDICINE

## 2023-12-01 RX ORDER — BUPROPION HYDROCHLORIDE 300 MG/1
300 TABLET ORAL EVERY MORNING
COMMUNITY
Start: 2023-11-15 | End: 2024-03-11

## 2023-12-01 RX ORDER — VALACYCLOVIR HYDROCHLORIDE 500 MG/1
500 TABLET, FILM COATED ORAL DAILY
Qty: 30 TABLET | Refills: 5 | Status: SHIPPED | OUTPATIENT
Start: 2023-12-01 | End: 2024-03-11

## 2023-12-01 ASSESSMENT — ANXIETY QUESTIONNAIRES
1. FEELING NERVOUS, ANXIOUS, OR ON EDGE: MORE THAN HALF THE DAYS
2. NOT BEING ABLE TO STOP OR CONTROL WORRYING: NOT AT ALL
7. FEELING AFRAID AS IF SOMETHING AWFUL MIGHT HAPPEN: NOT AT ALL
GAD7 TOTAL SCORE: 2
7. FEELING AFRAID AS IF SOMETHING AWFUL MIGHT HAPPEN: NOT AT ALL
6. BECOMING EASILY ANNOYED OR IRRITABLE: NOT AT ALL
IF YOU CHECKED OFF ANY PROBLEMS ON THIS QUESTIONNAIRE, HOW DIFFICULT HAVE THESE PROBLEMS MADE IT FOR YOU TO DO YOUR WORK, TAKE CARE OF THINGS AT HOME, OR GET ALONG WITH OTHER PEOPLE: SOMEWHAT DIFFICULT
GAD7 TOTAL SCORE: 2
8. IF YOU CHECKED OFF ANY PROBLEMS, HOW DIFFICULT HAVE THESE MADE IT FOR YOU TO DO YOUR WORK, TAKE CARE OF THINGS AT HOME, OR GET ALONG WITH OTHER PEOPLE?: SOMEWHAT DIFFICULT
GAD7 TOTAL SCORE: 2
4. TROUBLE RELAXING: NOT AT ALL
3. WORRYING TOO MUCH ABOUT DIFFERENT THINGS: NOT AT ALL
5. BEING SO RESTLESS THAT IT IS HARD TO SIT STILL: NOT AT ALL

## 2023-12-01 NOTE — PROGRESS NOTES
Olivia is a 23 year old who is being evaluated via a billable video visit.      How would you like to obtain your AVS? MyChart  If the video visit is dropped, the invitation should be resent by: Send to e-mail at: [email or web address]  Will anyone else be joining your video visit? No          Assessment & Plan     Herpes simplex vulvovaginitis  She had first outbreak 5 yrs ago or when she was 18 , she has not had outbreaks since then but now she has a different partner and they would like to be on the safe side , so she is interested to take the prophylactic daily valacyclovir  Will start 500 mg daily , will need to check kidney function at least once a yr and also at her next in office visit in 3 to 6 months sooner if any concerns    - valACYclovir (VALTREX) 500 MG tablet; Take 1 tablet (500 mg) by mouth daily      RTC if no improving or worsening.  Pt is aware  and comfortable with the current plan.  Follow up in 3 to 6 months on the above and also to check creatinine     Winter Birch MD  [facility]   Olivia is a 23 year old, presenting for the following health issues:  No chief complaint on file.      12/1/2023     2:39 PM   Additional Questions   Roomed by Luis A BARNES         12/1/2023     2:39 PM   Patient Reported Additional Medications   Patient reports taking the following new medications Buproprion       History of Present Illness       Reason for visit:  Curious about antiviral to prevent transmission of HSV1 to partner    She eats 2-3 servings of fruits and vegetables daily.She consumes 1 sweetened beverage(s) daily.She exercises with enough effort to increase her heart rate 60 or more minutes per day.  She exercises with enough effort to increase her heart rate 3 or less days per week.   She is taking medications regularly.         Review of Systems   Constitutional, HEENT, cardiovascular, pulmonary, GI, , musculoskeletal, neuro, skin, endocrine and psych systems  are negative, except as otherwise noted.      Objective           Vitals:  No vitals were obtained today due to virtual visit.    Physical Exam   GENERAL: Healthy, alert and no distress  EYES: Eyes grossly normal to inspection.  No discharge or erythema, or obvious scleral/conjunctival abnormalities.  RESP: No audible wheeze, cough, or visible cyanosis.  No visible retractions or increased work of breathing.    SKIN: Visible skin clear. No significant rash, abnormal pigmentation or lesions.  NEURO: Cranial nerves grossly intact.  Mentation and speech appropriate for age.  PSYCH: Mentation appears normal, affect normal/bright, judgement and insight intact, normal speech and appearance well-groomed.    No results found for this or any previous visit (from the past 24 hour(s)).            Video-Visit Details    Type of service:  Video Visit     Originating Location (pt. Location): Home    Distant Location (provider location):  On-site  Platform used for Video Visit: [employer]

## 2024-03-11 ENCOUNTER — OFFICE VISIT (OUTPATIENT)
Dept: ALLERGY | Facility: CLINIC | Age: 24
End: 2024-03-11
Payer: COMMERCIAL

## 2024-03-11 VITALS
HEART RATE: 72 BPM | OXYGEN SATURATION: 99 % | WEIGHT: 142 LBS | SYSTOLIC BLOOD PRESSURE: 113 MMHG | DIASTOLIC BLOOD PRESSURE: 72 MMHG | BODY MASS INDEX: 22.92 KG/M2

## 2024-03-11 DIAGNOSIS — T78.1XXA ALLERGIC REACTION TO TREE NUT: Primary | ICD-10-CM

## 2024-03-11 PROCEDURE — 99204 OFFICE O/P NEW MOD 45 MIN: CPT | Mod: 25 | Performed by: INTERNAL MEDICINE

## 2024-03-11 PROCEDURE — 95004 PERQ TESTS W/ALRGNC XTRCS: CPT | Performed by: INTERNAL MEDICINE

## 2024-03-11 RX ORDER — ESCITALOPRAM OXALATE 10 MG/1
10 TABLET ORAL DAILY
COMMUNITY

## 2024-03-11 RX ORDER — EPINEPHRINE 0.3 MG/.3ML
0.3 INJECTION SUBCUTANEOUS PRN
Qty: 2 EACH | Refills: 2 | Status: SHIPPED | OUTPATIENT
Start: 2024-03-11

## 2024-03-11 NOTE — PROGRESS NOTES
Olivia Stanley was seen in the Allergy Clinic at [facility].    Olivia Stanley is a 24 year old female being seen today for evaluation of food allergy concerns.  She is having symptoms of mouth tingling and tongue swelling with pistachio and also with vegan meals on 2 different occasions.  She is wondering if it might be cashew that was in the sauce.  Symptoms happen very quickly after eating and would last up to 1 to 2 hours.  She never had any hives and did not require any treatment nor emergency care.  She does not have any seasonal allergies that she is aware.    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  Occasionally while working will have symptoms of sneezing as well as itchy eyes and runny nose.    No past medical history on file.  No family history on file.  No past surgical history on file.    ENVIRONMENTAL HISTORY:   Pets inside the house include 0 cat(s).  Do you smoke cigarettes or other recreational drugs? No There is/are 0 smokers living in the house. The house do not have a damp basement.     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      Review of Systems      Current Outpatient Medications:     EPINEPHrine (ANY BX GENERIC EQUIV) 0.3 MG/0.3ML injection 2-pack, Inject 0.3 mLs (0.3 mg) into the muscle as needed for anaphylaxis, Disp: 2 each, Rfl: 2    escitalopram (LEXAPRO) 10 MG tablet, Take 10 mg by mouth daily, Disp: , Rfl:     lamoTRIgine (LAMICTAL) 25 MG tablet, Take 2 tablets (50 mg) by mouth daily, Disp: , Rfl:   No Known Allergies      EXAM:   /72   Pulse 72   Wt 64.4 kg (142 lb)   SpO2 99%   BMI 22.92 kg/m      Physical Exam    Constitutional:       General: She is not in acute distress.     Appearance: Normal appearance. She is not ill-appearing.   HENT:      Head: Normocephalic and atraumatic.      Nose: Nose normal. No congestion or rhinorrhea.      Mouth/Throat:      Mouth: Mucous  membranes are moist.      Pharynx: Oropharynx is clear. No posterior oropharyngeal erythema.   Eyes:      General:         Right eye: No discharge.         Left eye: No discharge.   Cardiovascular:      Rate and Rhythm: Normal rate and regular rhythm.      Heart sounds: Normal heart sounds.   Pulmonary:      Effort: Pulmonary effort is normal.      Breath sounds: Normal breath sounds. No wheezing or rhonchi.   Skin:     General: Skin is warm.      Findings: No erythema or rash.   Neurological:      General: No focal deficit present.      Mental Status: She is alert. Mental status is at baseline.   Psychiatric:         Mood and Affect: Mood normal.         Behavior: Behavior normal.      WORKUP: Skin testing was positive to pistachio and cashew.  Negative to the environmental allergens that can cause oral allergy syndrome.    ENVIRONMENTAL PERCUTANEOUS SKIN TESTING: ADULT      3/11/2024     3:00 PM   [facility]   Consent Y   Ordering Physician Dr. Hardy   Interpreting Physician DR. Hardy   Testing Technician Bronwyn GUAN   Location Back   Time start: 15:37   Time End: 15:52   Positive Control: Histatrol*ALK 1 mg/ml 6/20   Negative Control: 50% Glycerin 0   Pascual Grass (100,000 BAU/mL) 0   Birch Mix (W/F in millimeters) 0   Ragweed Mix* ALK (W/F in millimeters) 0   Sagebrush/Mugwort (W/F in millimeters) 0       FOOD ALLERGEN PERCUTANEOUS SKIN TESTING      3/11/2024     3:00 PM   [facility]    Consent Y   Ordering Physician Dr. Hardy   Interpreting Physician Dr. Hardy   Testing Technician Bronwyn GUAN   Location Back   Time start: 15:37   Time End: 15:52   Cashew  1:20 (W/F in millimeters) 15/40   Pistachio*ALK (1:10 w/v) 15/35         ASSESSMENT/PLAN:  Olivia Stanley is a 24 year old female seen today for food allergy concerns.  Cashew and pistachio skin test were positive.  She has not had any significant symptoms at this point but she does not eat significant amounts of pistachios and cashews.  Recommend  avoidance.  Will also order blood test for the additional tree nuts.  Will order the EpiPen for her tree nut allergy.  Anaphylaxis plan provided.    For her symptoms at work discussed Allegra and as needed Pataday.    Allegra 180 mg as needed  Pataday eyedrops as needed  Will check labs for nuts  Epipen ordered  Helpful websites for food allergies: www.foodallergy.org, www.[employer]safely.sportif225     Follow-up to be determined.      Thank you for allowing me to participate in the care of Olivia Stanley.      I spent 35 minutes on the date of the encounter doing chart review, history and exam, documentation and further coordination as noted above exclusive of separately reported interpretations    Sreedhar Hardy MD  Allergy/Immunology  [facility]

## 2024-03-11 NOTE — LETTER
3/11/2024         RE: Olivia Stanley  [address]  [address]  [place] [address]        Dear Colleague,    Thank you for referring your patient, Olivia Stanley, to the [facility] [place]. Please see a copy of my visit note below.    Olivia Stanley was seen in the Allergy Clinic at [facility].    Olivia Stanley is a 24 year old female being seen today for evaluation of food allergy concerns.  She is having symptoms of mouth tingling and tongue swelling with pistachio and also with vegan meals on 2 different occasions.  She is wondering if it might be cashew that was in the sauce.  Symptoms happen very quickly after eating and would last up to 1 to 2 hours.  She never had any hives and did not require any treatment nor emergency care.  She does not have any seasonal allergies that she is aware.    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  Occasionally while working will have symptoms of sneezing as well as itchy eyes and runny nose.    No past medical history on file.  No family history on file.  No past surgical history on file.    ENVIRONMENTAL HISTORY:   Pets inside the house include 0 cat(s).  Do you smoke cigarettes or other recreational drugs? No There is/are 0 smokers living in the house. The house do not have a damp basement.     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      Review of Systems      Current Outpatient Medications:      EPINEPHrine (ANY BX GENERIC EQUIV) 0.3 MG/0.3ML injection 2-pack, Inject 0.3 mLs (0.3 mg) into the muscle as needed for anaphylaxis, Disp: 2 each, Rfl: 2     escitalopram (LEXAPRO) 10 MG tablet, Take 10 mg by mouth daily, Disp: , Rfl:      lamoTRIgine (LAMICTAL) 25 MG tablet, Take 2 tablets (50 mg) by mouth daily, Disp: , Rfl:   No Known Allergies      EXAM:   /72   Pulse 72   Wt 64.4 kg (142 lb)   SpO2 99%   BMI 22.92 kg/m      Physical  Exam    Constitutional:       General: She is not in acute distress.     Appearance: Normal appearance. She is not ill-appearing.   HENT:      Head: Normocephalic and atraumatic.      Nose: Nose normal. No congestion or rhinorrhea.      Mouth/Throat:      Mouth: Mucous membranes are moist.      Pharynx: Oropharynx is clear. No posterior oropharyngeal erythema.   Eyes:      General:         Right eye: No discharge.         Left eye: No discharge.   Cardiovascular:      Rate and Rhythm: Normal rate and regular rhythm.      Heart sounds: Normal heart sounds.   Pulmonary:      Effort: Pulmonary effort is normal.      Breath sounds: Normal breath sounds. No wheezing or rhonchi.   Skin:     General: Skin is warm.      Findings: No erythema or rash.   Neurological:      General: No focal deficit present.      Mental Status: She is alert. Mental status is at baseline.   Psychiatric:         Mood and Affect: Mood normal.         Behavior: Behavior normal.      WORKUP: Skin testing was positive to pistachio and cashew.  Negative to the environmental allergens that can cause oral allergy syndrome.    ENVIRONMENTAL PERCUTANEOUS SKIN TESTING: ADULT      3/11/2024     3:00 PM   [facility]   Consent Y   Ordering Physician Dr. Hardy   Interpreting Physician DR. Hardy   Testing Technician Bronwyn GUAN   Location Back   Time start: 15:37   Time End: 15:52   Positive Control: Histatrol*ALK 1 mg/ml 6/20   Negative Control: 50% Glycerin 0   Pascual Grass (100,000 BAU/mL) 0   Birch Mix (W/F in millimeters) 0   Ragweed Mix* ALK (W/F in millimeters) 0   Sagebrush/Mugwort (W/F in millimeters) 0       FOOD ALLERGEN PERCUTANEOUS SKIN TESTING      3/11/2024     3:00 PM   [facility]    Consent Y   Ordering Physician Dr. Hardy   Interpreting Physician Dr. Hardy   Testing Technician Bronwyn GUAN   Location Back   Time start: 15:37   Time End: 15:52   Cashew  1:20 (W/F in millimeters) 15/40   Pistachio*ALK (1:10 w/v) 15/35          ASSESSMENT/PLAN:  Olivia Stanley is a 24 year old female seen today for food allergy concerns.  Cashew and pistachio skin test were positive.  She has not had any significant symptoms at this point but she does not eat significant amounts of pistachios and cashews.  Recommend avoidance.  Will also order blood test for the additional tree nuts.  Will order the EpiPen for her tree nut allergy.  Anaphylaxis plan provided.    For her symptoms at work discussed Allegra and as needed Pataday.    Allegra 180 mg as needed  Pataday eyedrops as needed  Will check labs for nuts  Epipen ordered  Helpful websites for food allergies: www.foodallergy.org, www.snacksafely.[employer]     Follow-up to be determined.      Thank you for allowing me to participate in the care of Olivia Stanley.      I spent 35 minutes on the date of the encounter doing chart review, history and exam, documentation and further coordination as noted above exclusive of separately reported interpretations    Sreedhar Hardy MD  Allergy/Immunology  [facility]      Per provider verbal order, placed Peanut/Tree Nut Panel scratch test.  Verbal consent was obtained by MD prior to procedure.  Once panels were placed, patient was monitored for 15 minutes in clinic.  Provider read test after 15 minutes.  Pt tolerated procedure well.  All questions and concerns were addressed at office visit.      ROGE Barnhart      Again, thank you for allowing me to participate in the care of your patient.        Sincerely,        Sreedhar Hardy MD

## 2024-03-11 NOTE — PROGRESS NOTES
Per provider verbal order, placed Peanut/Tree Nut Panel scratch test.  Verbal consent was obtained by MD prior to procedure.  Once panels were placed, patient was monitored for 15 minutes in clinic.  Provider read test after 15 minutes.  Pt tolerated procedure well.  All questions and concerns were addressed at office visit.      ROGE Barnhart

## 2024-03-11 NOTE — LETTER
ANAPHYLAXIS ALLERGY PLAN    Name: Olivia Stanley      :  2000    Allergy to:  Pistachio, Cashew    Weight: 142 lbs 0 oz           Asthma:  No      Do not depend on antihistamines or inhalers (bronchodilators) to treat a severe reaction; USE EPINEPHRINE      MEDICATIONS/DOSES  Epinephrine:  Epipen  Epinephrine dose:  0.3 mg IM  Antihistamine:  Zyrtec (Cetirizine)  Antihistamine dose:  10 mg         ANAPHYLAXIS ALLERGY PLAN (Page 2)  Patient:  Olivia Stanley  :  2000         Electronically signed on 2024 by:  Sreedhar Hardy MD  Parent/Guardian Authorization Signature:  ___________________________ Date:    FORM PROVIDED COURTESY OF FOOD ALLERGY RESEARCH & EDUCATION (FARE) ([email or web address]) 2017

## 2024-03-11 NOTE — PATIENT INSTRUCTIONS
Allegra 180 mg as needed  Pataday eyedrops as needed  Will check labs for nuts  Epipen ordered  Helpful websites for food allergies: www.foodallergy.org, www.Snap[employer]ious.[employer]       Allergy Staff Appt Hours Shot Hours Location       Physician   Sreedhar Hardy MD      Support Staff   CRYSTAL Waters MA Emily J., MA      Mondays Tuesdays Thursdays and Fridays:      Funmi 7-5      Wednesdays         Close                Mondays, Tuesdays and Fridays:  7:20 - 3:40              [facility]  [address]  [place] [address]  Allergy appointment  line: [phone]    Pulmonary Function Scheduling:  [place]: [phone]           Questions about cost of your care  For questions about your cost of your visit, procedure, lab or imaging contact: [employer] Price Line [phone] or visit:  [email or web address]    Prescription Assistance  If you need assistance with your prescriptions (cost, coverage, etc) please contact: [email or web address] Prescription Assistance Program [phone]    Important Scheduling Information  All visits for food challenges, medication/drug allergy testing, and drug challenges MUST be scheduled through the allergy clinic nurse. Please contact them via [employer] or by calling the clinic at [phone] and asking to speak with an allergy nurse. They will provide additional information and instructions for the appointment. Discontinue oral antihistamines 7 days prior to the appointment. Discontinue nasal and ocular antihistamines 1 day prior to the appointment.    Appointments for skin testing: Appointment will last approximately 45 minutes.  Please call the appointment line for your clinic to schedule.  Discontinue oral antihistamines 7 days prior to the appointment.  Discontinue nasal and ocular antihistamines 1 days prior to appointment.    Thank you for trusting us with your care. Please feel free to contact us with  any questions or concerns you may have.

## 2024-03-25 ENCOUNTER — LAB (OUTPATIENT)
Dept: LAB | Facility: CLINIC | Age: 24
End: 2024-03-25
Payer: COMMERCIAL

## 2024-03-25 DIAGNOSIS — T78.1XXA ALLERGIC REACTION TO TREE NUT: ICD-10-CM

## 2024-03-25 PROCEDURE — 36415 COLL VENOUS BLD VENIPUNCTURE: CPT

## 2024-03-25 PROCEDURE — 86003 ALLG SPEC IGE CRUDE XTRC EA: CPT

## 2024-03-27 LAB
ALMOND IGE QN: <0.1 KU(A)/L
BRAZIL NUT IGE QN: <0.1 KU(A)/L
CASHEW NUT IGE QN: 0.22 KU(A)/L
HAZELNUT IGE QN: <0.1 KU(A)/L
PECAN/HICK NUT IGE QN: <0.1 KU(A)/L
PINE NUT IGE QN: <0.1 KU(A)/L
PISTACHIO IGE QN: 0.33 KU(A)/L
WALNUT IGE QN: <0.1 KU(A)/L

## 2024-03-28 LAB — MACADAMIA IGE QN: <0.1 KU(A)/L

## 2024-07-08 ENCOUNTER — VIRTUAL VISIT (OUTPATIENT)
Dept: MIDWIFE SERVICES | Facility: CLINIC | Age: 24
End: 2024-07-08
Payer: COMMERCIAL

## 2024-07-08 DIAGNOSIS — Z30.011 ENCOUNTER FOR INITIAL PRESCRIPTION OF CONTRACEPTIVE PILLS: Primary | ICD-10-CM

## 2024-07-08 PROCEDURE — 99203 OFFICE O/P NEW LOW 30 MIN: CPT | Mod: 95 | Performed by: ADVANCED PRACTICE MIDWIFE

## 2024-07-08 RX ORDER — LEVONORGESTREL/ETHIN.ESTRADIOL 0.1-0.02MG
1 TABLET ORAL DAILY
Qty: 112 TABLET | Refills: 3 | Status: SHIPPED | OUTPATIENT
Start: 2024-07-08

## 2024-07-08 NOTE — PROGRESS NOTES
Virtual Visit Details    Type of service:  Video Visit     Originating Location (pt. Location): Home    Distant Location (provider location):  On-site [facility]   Platform used for Video Visit: Jeff    Olivia MIGUEL Lochen 24 year old LMP, 'about to get her period now' Nullip     CC: Contraceptive Pill start    HPI: Pt would like to restart Ocps.  She has been on Alesse previously and reports she did well with it.  Pt has sig mental health hx and medication list     Lamictal 25mg (mood)  Lexapro 10mg (mood)  Buspirone 15mg (jaw pain from buproprion)  Buproprion 150mg (mood)     Pt would like to delay her menses in September for a vacation, otherwise intends to take the pill in the traditional fashion with a menses each month.    Pt denies HA with aura, clotting disorder, CA hx of hypertension concerns.  Last documented BP was 113/72.    A/P:  Healthy 23 y/o desires OCPs  (Z30.011) Encounter for initial prescription of contraceptive pills  (primary encounter diagnosis)  Comment:   Plan: levonorgestrel-ethinyl estradiol (AVIANE)         0.1-20 MG-MCG tablet  Discussed pill start and how to use to delay menses if desires.  Contracept x 7 days after starting the pill.  RTC with questions or concerns    JUAN ANTONIO Bianchi CNM

## 2024-07-08 NOTE — PATIENT INSTRUCTIONS
Dear Olivia    It was a pleasure to meet with you today.  Enjoy your trip to [place]!!     Here is an information sheet on the birth control pill.      What is the birth control pill?  The birth control pill, also known as the pill, or oral contraceptives is a pill with human made hormones that prevent you from releasing an egg and preventing pregnancy.  Other women take a birth control pill to control acne or to make their periods lighter or more tolerable.  There are usually two types of hormones in the pills, an estrogen and a progesterone part.    Should I take the pill in a traditional method or continuous method?  There are two options as to how you take your pill.  The first is the traditional method, take the pills in your pill pack as they come, this will include a placebo set of pills, usually a different color in your pill pack, it is during this time you will probably get your period.     The other option is to take the birth control pill in a continuous method, where you continue the hormone pills every day and it allows you to skip some periods.  Your provider will discuss this with you if it is an option for you.  The kind of pill that will work for this method needs to have only one level of estrogen in it, and pill that has the word 'tri' as part of the name is not a candidate for continuous method.  The tri indicates three different doses of estrogen.    Start your pill, continue taking the hormone pills, skipping the placebo pills (different color) every day.  If you have three days of spotting in a row this is a signal that your body needs to have a period.  Stop taking your birth control pill for 5 days, you will most likely have a period and then you can start again.  Sometimes it may take a shorter time between periods, then as your body gets used to it, the time between periods may lengthen out.      How should you start taking the pill?  Start taking the pill as soon as you get it, no matter  where you are in your cycle.  Some pill packs have the days of the week written by each pill to help you keep track.  If it would be easier, the other option is to get your pills today or tomorrow and wait to start until Sunday.  Then the day of the week would match your pill pack.  By starting right away your birth control coverage with be sooner, but you may have more break through bleeding initially.    You could also wait until you get your next period and start taking your pill on the first Sunday after you get your period.  Your birth control coverage will be delayed, but often you won't have as many break through bleeding days.      Use a back up method of birth control, like condoms, for 7 days after you start taking the pill.    What are the side effects of the pill?  The minor side effects some women experience are:   Breakthrough spotting  weight changes   Nausea    acne   Breast tenderness  headaches    Most of the time these side effects go away within 3 months.  If they don t, or are troublesome for you then call the clinic to switch pills.  Each pill works a little differently and has different side effects for different women.    Some very rare but serious side effects are heart attack, stroke, or a clot in your leg or lungs.  If you are having sudden, intense pain, then please call the clinic right away day or night.  If the clinic is closed stay on the line and the answering service will page the midwife.  The other option is to call 911.    What should you do if you miss a pill?  Try to take your pill at the same time everyday.  Try setting an alarm on your watch or on your phone to remind yourself.    If you miss a pill take it as soon as you remember, even if it is the next day.  Take the pill you missed and take the one due that day.    If you miss two pills then take two pills for the next two days so you can catch up.  You need to use a back-up method of birth control, like condoms for 7  days.     Questions?  If you have other questions or worries you can gilma the clinic. [phone]  or you may refer to your package insert that came with your pills.    JUAN ANTONIO Bianchi CNM

## 2024-07-14 ENCOUNTER — HEALTH MAINTENANCE LETTER (OUTPATIENT)
Age: 24
End: 2024-07-14